# Patient Record
Sex: FEMALE | Race: WHITE | Employment: UNEMPLOYED | URBAN - METROPOLITAN AREA
[De-identification: names, ages, dates, MRNs, and addresses within clinical notes are randomized per-mention and may not be internally consistent; named-entity substitution may affect disease eponyms.]

---

## 2017-01-19 ENCOUNTER — OFFICE VISIT (OUTPATIENT)
Dept: ORTHOPEDIC SURGERY | Age: 82
End: 2017-01-19

## 2017-01-19 VITALS
TEMPERATURE: 98.2 F | DIASTOLIC BLOOD PRESSURE: 60 MMHG | HEIGHT: 60 IN | BODY MASS INDEX: 38.28 KG/M2 | HEART RATE: 72 BPM | RESPIRATION RATE: 16 BRPM | WEIGHT: 195 LBS | SYSTOLIC BLOOD PRESSURE: 118 MMHG

## 2017-01-19 DIAGNOSIS — M54.50 CHRONIC MIDLINE LOW BACK PAIN WITHOUT SCIATICA: ICD-10-CM

## 2017-01-19 DIAGNOSIS — M84.48XD SACRAL INSUFFICIENCY FRACTURE, WITH ROUTINE HEALING, SUBSEQUENT ENCOUNTER: Primary | ICD-10-CM

## 2017-01-19 DIAGNOSIS — M25.561 RIGHT KNEE PAIN, UNSPECIFIED CHRONICITY: ICD-10-CM

## 2017-01-19 DIAGNOSIS — G89.29 CHRONIC MIDLINE LOW BACK PAIN WITHOUT SCIATICA: ICD-10-CM

## 2017-01-19 DIAGNOSIS — M17.11 OSTEOARTHRITIS OF RIGHT KNEE, UNSPECIFIED OSTEOARTHRITIS TYPE: ICD-10-CM

## 2017-01-19 RX ORDER — DICLOFENAC SODIUM 10 MG/G
4 GEL TOPICAL 4 TIMES DAILY
Qty: 5 EACH | Refills: 3 | Status: SHIPPED | OUTPATIENT
Start: 2017-01-19 | End: 2017-06-22

## 2017-01-19 NOTE — PATIENT INSTRUCTIONS
Low Back Arthritis: Exercises  Your Care Instructions  Here are some examples of typical rehabilitation exercises for your condition. Start each exercise slowly. Ease off the exercise if you start to have pain. Your doctor or physical therapist will tell you when you can start these exercises and which ones will work best for you. When you are not being active, find a comfortable position for rest. Some people are comfortable on the floor or a medium-firm bed with a small pillow under their head and another under their knees. Some people prefer to lie on their side with a pillow between their knees. Don't stay in one position for too long. Take short walks (10 to 20 minutes) every 2 to 3 hours. Avoid slopes, hills, and stairs until you feel better. Walk only distances you can manage without pain, especially leg pain. How to do the exercises  Pelvic tilt    1. Lie on your back with your knees bent. 2. \"Brace\" your stomach--tighten your muscles by pulling in and imagining your belly button moving toward your spine. 3. Press your lower back into the floor. You should feel your hips and pelvis rock back. 4. Hold for 6 seconds while breathing smoothly. 5. Relax and allow your pelvis and hips to rock forward. 6. Repeat 8 to 12 times. Back stretches    1. Get down on your hands and knees on the floor. 2. Relax your head and allow it to droop. Round your back up toward the ceiling until you feel a nice stretch in your upper, middle, and lower back. Hold this stretch for as long as it feels comfortable, or about 15 to 30 seconds. 3. Return to the starting position with a flat back while you are on your hands and knees. 4. Let your back sway by pressing your stomach toward the floor. Lift your buttocks toward the ceiling. 5. Hold this position for 15 to 30 seconds. 6. Repeat 2 to 4 times. Follow-up care is a key part of your treatment and safety.  Be sure to make and go to all appointments, and call your doctor if you are having problems. It's also a good idea to know your test results and keep a list of the medicines you take. Where can you learn more? Go to http://margarita-eddie.info/. Enter J549 in the search box to learn more about \"Low Back Arthritis: Exercises. \"  Current as of: May 23, 2016  Content Version: 11.1  © 6168-7158 [employer]. Care instructions adapted under license by [employer] (which disclaims liability or warranty for this information). If you have questions about a medical condition or this instruction, always ask your healthcare professional. [place] [address] any warranty or liability for your use of this information. Knee Arthritis: Exercises  Your Care Instructions  Here are some examples of exercises for knee arthritis. Start each exercise slowly. Ease off the exercise if you start to have pain. Your doctor or physical therapist will tell you when you can start these exercises and which ones will work best for you. How to do the exercises  Knee flexion with heel slide    7. Lie on your back with your knees bent. 8. Slide your heel back by bending your affected knee as far as you can. Then hook your other foot around your ankle to help pull your heel even farther back. 9. Hold for about 6 seconds, then rest for up to 10 seconds. 10. Repeat 8 to 12 times. 11. Switch legs and repeat steps 1 through 4, even if only one knee is sore. Quad sets    7. Sit with your affected leg straight and supported on the floor or a firm bed. Place a small, rolled-up towel under your knee. Your other leg should be bent, with that foot flat on the floor. 8. Tighten the thigh muscles of your affected leg by pressing the back of your knee down into the towel. 9. Hold for about 6 seconds, then rest for up to 10 seconds. 10. Repeat 8 to 12 times. 11. Switch legs and repeat steps 1 through 4, even if only one knee is sore.   Straight-leg raises to the front    1. Lie on your back with your good knee bent so that your foot rests flat on the floor. Your affected leg should be straight. Make sure that your low back has a normal curve. You should be able to slip your hand in between the floor and the small of your back, with your palm touching the floor and your back touching the back of your hand. 2. Tighten the thigh muscles in your affected leg by pressing the back of your knee flat down to the floor. Hold your knee straight. 3. Keeping the thigh muscles tight and your leg straight, lift your affected leg up so that your heel is about 12 inches off the floor. Hold for about 6 seconds, then lower slowly. 4. Relax for up to 10 seconds between repetitions. 5. Repeat 8 to 12 times. 6. Switch legs and repeat steps 1 through 5, even if only one knee is sore. Active knee flexion    1. Lie on your stomach with your knees straight. If your kneecap is uncomfortable, roll up a washcloth and put it under your leg just above your kneecap. 2. Lift the foot of your affected leg by bending the knee so that you bring the foot up toward your buttock. If this motion hurts, try it without bending your knee quite as far. This may help you avoid any painful motion. 3. Slowly move your leg up and down. 4. Repeat 8 to 12 times. 5. Switch legs and repeat steps 1 through 4, even if only one knee is sore. Quadriceps stretch (facedown)    1. Lie flat on your stomach, and rest your face on the floor. 2. Wrap a towel or belt strap around the lower part of your affected leg. Then use the towel or belt strap to slowly pull your heel toward your buttock until you feel a stretch. 3. Hold for about 15 to 30 seconds, then relax your leg against the towel or belt strap. 4. Repeat 2 to 4 times. 5. Switch legs and repeat steps 1 through 4, even if only one knee is sore.   Stationary exercise bike    If you do not have a stationary exercise bike at home, you can find one to ride at your local health club or community center. 1. Adjust the height of the bike seat so that your knee is slightly bent when your leg is extended downward. If your knee hurts when the pedal reaches the top, you can raise the seat so that your knee does not bend as much. 2. Start slowly. At first, try to do 5 to 10 minutes of cycling with little to no resistance. Then increase your time and the resistance bit by bit until you can do 20 to 30 minutes without pain. 3. If you start to have pain, rest your knee until your pain gets back to the level that is normal for you. Or cycle for less time or with less effort. Follow-up care is a key part of your treatment and safety. Be sure to make and go to all appointments, and call your doctor if you are having problems. It's also a good idea to know your test results and keep a list of the medicines you take. Where can you learn more? Go to http://margarita-eddie.info/. Enter C159 in the search box to learn more about \"Knee Arthritis: Exercises. \"  Current as of: May 23, 2016  Content Version: 11.1  © 4531-1121 [employer]. Care instructions adapted under license by [employer] (which disclaims liability or warranty for this information). If you have questions about a medical condition or this instruction, always ask your healthcare professional. [address] any warranty or liability for your use of this information.

## 2017-01-19 NOTE — PROGRESS NOTES
[facility]  [address] [place], [address]  Phone: [phone]  Fax: [phone]          HISTORY OF PRESENT ILLNESS:  Ciarra Rodriguez is a [de-identified] y.o. female with history of lumbar pain. Pt had a sacroplasty with Dr. Bertha Genao since her last office visit and has experienced significant improvement in her pain. She c/o intermittent pain in the right knee since her sacroplasty. Pt states that she wakes up with pain in the right knee. She no longer takes Tylenol #3 since her pain has improved and wishes to continue with OTC Tylenol as needed. Pt has been prescribed Mobic 7.5 mg and takes it intermittently as needed. She has been using olive oil on her right knee with benefit. Pt at this time desires to proceed with medication evaluation. Pain Scale: 0 - No pain/10    PCP: Gely Little DO       Past Medical History   Diagnosis Date    Atrial fibrillation ([facility] [address]     Balance problem     Chest pain, unspecified      Resolved.  Chronic diastolic heart failure (HCC)      Stable symptoms.  Congestive heart failure, unspecified     Diarrhea 12/15/2015     f/u PCP     Essential hypertension     High blood pressure     Low back pain     Morbid obesity (HCC)     On amiodarone therapy 6/26/2015 12/14 SGOT45, SGPT41, TSH 2.7 3/10 84%DLCO     Other and unspecified hyperlipidemia     Pacemaker     Reflux     Trauma     Unspecified disorder of thyroid      Min hyperthyroidism. Social History     Social History    Marital status:      Spouse name: N/A    Number of children: N/A    Years of education: N/A     Occupational History    Not on file.      Social History Main Topics    Smoking status: Never Smoker    Smokeless tobacco: Never Used    Alcohol use No    Drug use: No    Sexual activity: Not on file     Other Topics Concern    Not on file     Social History Narrative       Current Outpatient Prescriptions Medication Sig Dispense Refill    diclofenac (VOLTAREN) 1 % gel Apply 4 g to affected area four (4) times daily. Apply to right knee 5 Each 3    amiodarone (CORDARONE) 200 mg tablet TAKE 1 TABLET BY MOUTH ONCE EVERY DAY 30 Tab 0    valsartan-hydrochlorothiazide (DIOVAN-HCT) 160-25 mg per tablet Take 1 Tab by mouth daily. 90 Tab 3    amLODIPine (NORVASC) 5 mg tablet Take 1 Tab by mouth daily. 90 Tab 3    pravastatin (PRAVACHOL) 40 mg tablet Take 1 Tab by mouth nightly. 90 Tab 3    ezetimibe (ZETIA) 10 mg tablet Take 1 Tab by mouth daily. 90 Tab 3    acetaminophen-codeine (TYLENOL #3) 300-30 mg per tablet 1 po bid prn severe pain  Indications: PAIN 60 Tab 0    ranitidine (ZANTAC) 150 mg tablet Take 150 mg by mouth two (2) times a day.  meloxicam (MOBIC) 7.5 mg tablet TAKE 1 TABLET BY MOUTH DAILY 90 Tab 0    miscellaneous medical supply (T.E.D. KNEE LENGTH-M-LONG) misc 2 Each by Does Not Apply route daily. 2 Each 0       Allergies   Allergen Reactions    Penicillins Unknown (comments)         REVIEW OF SYSTEMS    Constitutional: Negative for fever, chills, or weight change. Respiratory: Negative for cough or shortness of breath. Cardiovascular: Negative for chest pain or palpitations. Gastrointestinal: Negative for acid reflux, change in bowel habits, or constipation. Genitourinary: Negative for dysuria and flank pain. Musculoskeletal: Positive for lumbar pain. Skin: Negative for rash. Neurological: Negative for headaches, dizziness, or numbness. Endo/Heme/Allergies: Negative for increased bruising. Psychiatric/Behavioral: Negative for difficulty with sleep. PHYSICAL EXAMINATION  Visit Vitals    /60    Pulse 72    Temp 98.2 °F (36.8 °C) (Oral)    Resp 16    Ht 5' (1.524 m)    Wt 195 lb (88.5 kg)    BMI 38.08 kg/m2       Constitutional: Awake, alert, and in no acute distress  Neurological: 1+ symmetrical DTRs in the upper extremities.  1+ symmetrical DTRs in the lower extremities. Sensation to light touch is intact. Negative Shey's sign bilaterally. Skin: warm, dry, and intact. Musculoskeletal: Slight pronation on the right when walking. No tenderness to palpation in the lower lumbar region. No pain with extension, axial loading, or forward flexion. No pain with internal or external rotation of her hips. Negative straight leg raise bilaterally. Biceps  Triceps Deltoids Wrist Ext Wrist Flex Hand Intrin   Right +4/5 +4/5 +4/5 +4/5 +4/5 +4/5   Left +4/5 +4/5 +4/5 +4/5 +4/5 +4/5      Hip Flex  Quads Hamstrings Ankle DF EHL Ankle PF   Right +4/5 +4/5 +4/5 +4/5 +4/5 +4/5   Left +4/5 +4/5 +4/5 +4/5 +4/5 +4/5     IMAGING:    Lumbar CT from 08/02/2016 was personally reviewed with the Pt and demonstrated:  FINDINGS:     Acute or subacute fracture upper sacrum, predominantly through the left sacral  ala, horizontally across S2, with nondisplaced fracture line through S1. No  significant displacement. Associated mild sclerosis. No adjacent hematoma. The  sacroiliac joints are intact and stable, with mild degenerative change on the  right. The lumbar vertebra are intact.     Lumbar vertebral body heights are maintained. Grade I anterolisthesis of L4 on  L5 of 4 mm is not significantly changed. Disc space heights are maintained. Lower lumbar facet joint hypertrophy, severe at left L5-S1.     Diverticulosis. Left renal cyst.     T12-L1: Stable small broad-based disc protrusion with calcified posterior disc  annulus. No spinal stenosis or foraminal narrowing.     L1-L2: Unremarkable.     L2-L3: Stable mild bilateral facet hypertrophy. Minimal disc bulge without canal  or neural foraminal stenosis.     L3-L4: Mild to moderate bilateral facet joint hypertrophy, stable. Mild stable  disc bulge. No canal stenosis or foraminal narrowing.     L4-L5: Stable grade I anterolisthesis. No spinal stenosis or foraminal  narrowing.  Moderate bilateral facet joint hypertrophy, stable.     L5-S1: Minimal central disc protrusion. No canal stenosis or foraminal  narrowing. Mild right and severe left facet joint hypertrophy is similar to  prior.     IMPRESSION:  1. Acute/subacute left and central sacral fractures as above. 2. Grossly stable appearance of the lumbar spine with minimal degenerative disc  disease and stable grade I L4-L5 anterolisthesis. 3. Grossly stable lumbar facet joint hypertrophy, severe at left L5-S1.     Wet reading regarding fractures was provided to the referring physician via  the [employer] system. [address] was seen today for follow-up, back pain and knee pain. Diagnoses and all orders for this visit:    Sacral insufficiency fracture, with routine healing, subsequent encounter    Chronic midline low back pain without sciatica    Right knee pain, unspecified chronicity  -     diclofenac (VOLTAREN) 1 % gel; Apply 4 g to affected area four (4) times daily. Apply to right knee    Osteoarthritis of right knee, unspecified osteoarthritis type  -     diclofenac (VOLTAREN) 1 % gel; Apply 4 g to affected area four (4) times daily. Apply to right knee       IMPRESSION AND PLAN:  Naveed Nguyen is a [de-identified] y.o. female with history of lumbar pain. Pt had a sacroplasty with Dr. Satish Krishnan since her last office visit and has experienced significant improvement in her pain. She c/o intermittent pain in the right knee since her sacroplasty. Pt no longer takes Tylenol #3 since her pain has improved and wishes to continues with OTC Tylenol as needed. 1) Pt was given information on lumbar arthritis exercises. 2) I recommended the Pt try taking OTC Tylenol 500 mg 1-2 tabs daily. 3) She was prescribed Voltaren 1% gel. 4) Pt will continue taking Mobic 7.5 mg as needed and does not need a refill at this time. 5) Ms. Enrique Gutierres has a reminder for a \"due or due soon\" health maintenance.  I have asked that she contact her primary care provider, Cole Blackburn DO,  for follow-up on this health maintenance. 6)  reviewed. 7) Pt will follow-up as needed.       Written by Moreno Jensen, as dictated by Duc Evans MD.

## 2017-01-27 ENCOUNTER — OFFICE VISIT (OUTPATIENT)
Dept: CARDIOLOGY CLINIC | Age: 82
End: 2017-01-27

## 2017-01-27 VITALS
BODY MASS INDEX: 36.52 KG/M2 | HEART RATE: 77 BPM | DIASTOLIC BLOOD PRESSURE: 60 MMHG | WEIGHT: 186 LBS | HEIGHT: 60 IN | SYSTOLIC BLOOD PRESSURE: 117 MMHG

## 2017-01-27 DIAGNOSIS — Z95.0 CARDIAC PACEMAKER IN SITU: ICD-10-CM

## 2017-01-27 DIAGNOSIS — I48.0 PAROXYSMAL ATRIAL FIBRILLATION (HCC): ICD-10-CM

## 2017-01-27 DIAGNOSIS — Z79.899 ON AMIODARONE THERAPY: ICD-10-CM

## 2017-01-27 DIAGNOSIS — I50.32 CHRONIC DIASTOLIC HEART FAILURE (HCC): Primary | ICD-10-CM

## 2017-01-27 DIAGNOSIS — I10 ESSENTIAL HYPERTENSION, BENIGN: ICD-10-CM

## 2017-01-27 RX ORDER — AMLODIPINE BESYLATE 2.5 MG/1
2.5 TABLET ORAL DAILY
Qty: 90 TAB | Refills: 1 | Status: SHIPPED | OUTPATIENT
Start: 2017-01-27 | End: 2017-10-11 | Stop reason: SDUPTHER

## 2017-01-27 NOTE — PROGRESS NOTES
HISTORY OF PRESENT ILLNESS  Margarita Mendoza is a [de-identified] y.o. female. HPI Comments: 1/17 f/u of CHF, HTN, DDD, obesity    12/15 diarrhoea and diverticulitis for 1-2 wks; also happened in 11/15    CHF   The history is provided by the medical records. This is a chronic problem. The problem has not changed since onset. Associated symptoms include shortness of breath. Pertinent negatives include no chest pain and no headaches. Hypertension   The history is provided by the medical records. This is a chronic problem. Associated symptoms include shortness of breath. Pertinent negatives include no chest pain and no headaches. Cholesterol Problem   The history is provided by the medical records. This is a chronic problem. Associated symptoms include shortness of breath. Pertinent negatives include no chest pain and no headaches. Palpitations    The history is provided by the medical records (h/o AFib). This is a recurrent problem. The current episode started more than 1 week ago. The problem occurs daily. The problem is associated with exercise (walking). Associated symptoms include lower extremity edema, dizziness and shortness of breath. Pertinent negatives include no fever, no malaise/fatigue, no chest pain, no claudication, no orthopnea, no PND, no nausea, no vomiting, no headaches and no cough. Her past medical history is significant for hypertension. Leg Swelling   The history is provided by the patient. This is a chronic problem. The current episode started more than 1 week ago. The problem occurs every several days (minimal). Associated symptoms include shortness of breath. Pertinent negatives include no chest pain and no headaches. The symptoms are aggravated by standing. The symptoms are relieved by sleep. Shortness of Breath   The history is provided by the patient. This is a recurrent problem. The problem occurs frequently. The current episode started more than 1 week ago.  The problem has not changed since onset. Pertinent negatives include no fever, no headaches, no cough, no wheezing, no PND, no orthopnea, no chest pain, no vomiting, no rash, no leg swelling and no claudication. The problem's precipitants include exercise (walking 500-600 steps; 8/16 1000 steps;1/17 700 steps). Dizziness   The history is provided by the patient. This is a new problem. The current episode started more than 1 week ago (6/15). The problem occurs rarely. The problem has been rapidly improving. Associated symptoms include shortness of breath. Pertinent negatives include no chest pain and no headaches. Associated symptoms comments: No falls  . The symptoms are aggravated by standing. The symptoms are relieved by sleep. She has tried nothing for the symptoms. Review of Systems   Constitutional: Negative for chills, fever, malaise/fatigue and weight loss. HENT: Negative for nosebleeds. Eyes: Negative for discharge. Respiratory: Positive for shortness of breath. Negative for cough and wheezing. Cardiovascular: Positive for palpitations. Negative for chest pain, orthopnea, claudication, leg swelling and PND. Gastrointestinal: Negative for diarrhea, nausea and vomiting. Genitourinary: Negative for dysuria and hematuria. Musculoskeletal: Negative for joint pain. Skin: Negative for rash. Neurological: Positive for dizziness. Negative for seizures, loss of consciousness and headaches. Endo/Heme/Allergies: Negative for polydipsia. Does not bruise/bleed easily. Psychiatric/Behavioral: Negative for depression and substance abuse. The patient does not have insomnia. Allergies   Allergen Reactions    Penicillins Unknown (comments)     Pt states she's not allergic       Past Medical History   Diagnosis Date    Atrial fibrillation ([address]     Balance problem     Chest pain, unspecified      Resolved.  Chronic diastolic heart failure (HCC)      Stable symptoms.     Congestive heart failure, unspecified     Diarrhea 12/15/2015     f/u PCP     Essential hypertension     High blood pressure     Low back pain     Morbid obesity (HCC)     On amiodarone therapy 6/26/2015 12/14 SGOT45, SGPT41, TSH 2.7 3/10 84%DLCO     Other and unspecified hyperlipidemia     Pacemaker     Reflux     Trauma     Unspecified disorder of thyroid      Min hyperthyroidism. Family History   Problem Relation Age of Onset    Heart Attack Father 37    Sudden Death Father     Hypertension Mother     Heart Disease Neg Hx      No history of ischemic heart disease.  Stroke Neg Hx        Social History   Substance Use Topics    Smoking status: Never Smoker    Smokeless tobacco: Never Used    Alcohol use No        Current Outpatient Prescriptions   Medication Sig    diclofenac (VOLTAREN) 1 % gel Apply 4 g to affected area four (4) times daily. Apply to right knee    meloxicam (MOBIC) 7.5 mg tablet TAKE 1 TABLET BY MOUTH DAILY    amiodarone (CORDARONE) 200 mg tablet TAKE 1 TABLET BY MOUTH ONCE EVERY DAY    valsartan-hydrochlorothiazide (DIOVAN-HCT) 160-25 mg per tablet Take 1 Tab by mouth daily.  amLODIPine (NORVASC) 5 mg tablet Take 1 Tab by mouth daily.  pravastatin (PRAVACHOL) 40 mg tablet Take 1 Tab by mouth nightly.  ezetimibe (ZETIA) 10 mg tablet Take 1 Tab by mouth daily.  acetaminophen-codeine (TYLENOL #3) 300-30 mg per tablet 1 po bid prn severe pain  Indications: PAIN    ranitidine (ZANTAC) 150 mg tablet Take 150 mg by mouth two (2) times a day.  miscellaneous medical supply (T.E.D. KNEE LENGTH-M-LONG) misc 2 Each by Does Not Apply route daily. No current facility-administered medications for this visit. Past Surgical History   Procedure Laterality Date    Hx pacemaker  2002     Demand cardiac pacemaker-Had RV lead perforating and had sternot[facility].        Diagnostic Studies:  CARDIOLOGY STUDIES 3/10/2015 9/17/2013 6/1/2012 5/1/2012 3/1/2012 2/1/2012 6/1/2011   EKG Result - - - V paced, ?? underlying atrial rhythm. - - -   ICD / Pacemaker Check Result - - gen change office check [facility] function -   Myocardial Perfusion Scan Result - - - - - - -   Echocardiogram - Complete Result - - - - 65% EF, mild MR/AI, PAP 48. - -   PFT's with DLCO Result 84%DLCO 94%DLCO - - - - see report   24 hr Holter Monitor Result - - - - - - -       Visit Vitals    /60    Pulse 77    Ht 5' (1.524 m)    Wt 84.4 kg (186 lb)    BMI 36.33 kg/m2       Ms. Paco Dyson has a reminder for a \"due or due soon\" health maintenance. I have asked that she contact her primary care provider for follow-up on this health maintenance. Physical Exam   Constitutional: She is oriented to person, place, and time. She appears well-developed and well-nourished. No distress. HENT:   Head: Normocephalic and atraumatic. Mouth/Throat: Normal dentition. Eyes: Right eye exhibits no discharge. Left eye exhibits no discharge. No scleral icterus. Neck: Neck supple. No JVD present. Carotid bruit is not present. No thyromegaly present. Cardiovascular: Normal rate, regular rhythm, S1 normal, S2 normal, normal heart sounds and intact distal pulses. Exam reveals no gallop and no friction rub. No murmur heard. Pulmonary/Chest: Effort normal and breath sounds normal. She has no wheezes. She has no rales. Abdominal: Soft. She exhibits no mass. There is no tenderness. Musculoskeletal: She exhibits edema (trace). Lymphadenopathy:        Right cervical: No superficial cervical adenopathy present. Left cervical: No superficial cervical adenopathy present. Neurological: She is alert and oriented to person, place, and time. Skin: Skin is warm and dry. No rash noted. Psychiatric: She has a normal mood and affect. Her behavior is normal.       ASSESSMENT and [address] was seen today for chf, hypertension and cholesterol problem.     Diagnoses and all orders for this visit:    Chronic diastolic heart failure ([facility] [address]  Comments:  1/17 NYHA 2;     Essential hypertension, benign  Comments:  1/17 reduce norvasc to 2.5  8/16 reduce norvasc to 5  Orders:  -     amLODIPine (NORVASC) 2.5 mg tablet; Take 1 Tab by mouth daily. Paroxysmal atrial fibrillation (HCC)  Comments:  Staying SR on amio      Cardiac pacemaker in situ-DDD  Comments:   1/17 nl function, chr high V threshold    Orders:  -     PROGRAM EVAL (IN PERSON) IMPLANT DEVICE,PACEMAKER,MULT LEAD    On amiodarone therapy  Comments:  LFT 12/14 SGOT45, SGPT41, 10/15, 5/16(min incr SGOT 51); 12/16-stable SGOT  TFT nl 12/14, 6/15, 5/16  3/10; 3/15 84%DLCO  Orders:  -     PFT DLCO; Future  -     PULMONARY FUNCTION TEST; Future  -     T4, FREE (HDL ONLY); Future  -     TSH 3RD GENERATION; Future        Pertinent laboratory and test data reviewed and discussed with patient.   See patient instructions also for other medical advice given    Medications Discontinued During This Encounter   Medication Reason    amLODIPine (NORVASC) 5 mg tablet Reorder       Follow-up Disposition:  Return in about 6 months (around 7/27/2017), or if symptoms worsen or fail to improve, for post test.

## 2017-01-27 NOTE — LETTER
Oliva Hemphill 
1935 
 
1/27/2017 Dear Jemima Murry MD 
 
I had the pleasure of evaluating  Ms. Tim Marks in office today. Below are the relevant portions of my assessment and plan of care. ICD-10-CM ICD-9-CM 1. Chronic diastolic heart failure (HCC) I50.32 428.32   
 1/17 NYHA 2;   
2. Essential hypertension, benign I10 401.1 amLODIPine (NORVASC) 2.5 mg tablet 1/17 reduce norvasc to 2.5 
8/16 reduce norvasc to 5 3. Paroxysmal atrial fibrillation (HCC) I48.0 427.31 Staying SR on amio 4. Cardiac pacemaker in situ-DDD Z95.0 V45.01 PROGRAM EVAL (IN PERSON) IMPLANT DEVICE,PACEMAKER,MULT LEAD  
  1/17 nl function, chr high V threshold 5. On amiodarone therapy Z79.899 V58.69 PFT DLCO PULMONARY FUNCTION TEST T4, FREE (HDL ONLY) TSH 3RD GENERATION  
 LFT 12/14 SGOT45, SGPT41, 10/15, 5/16(min incr SGOT 51); 12/16-stable SGOT 
TFT nl 12/14, 6/15, 5/16 
3/10; 3/15 84%DLCO Current Outpatient Prescriptions Medication Sig Dispense Refill  amLODIPine (NORVASC) 2.5 mg tablet Take 1 Tab by mouth daily. 90 Tab 1  
 diclofenac (VOLTAREN) 1 % gel Apply 4 g to affected area four (4) times daily. Apply to right knee 5 Each 3  
 meloxicam (MOBIC) 7.5 mg tablet TAKE 1 TABLET BY MOUTH DAILY 90 Tab 0  
 amiodarone (CORDARONE) 200 mg tablet TAKE 1 TABLET BY MOUTH ONCE EVERY DAY 30 Tab 0  
 valsartan-hydrochlorothiazide (DIOVAN-HCT) 160-25 mg per tablet Take 1 Tab by mouth daily. 90 Tab 3  pravastatin (PRAVACHOL) 40 mg tablet Take 1 Tab by mouth nightly. 90 Tab 3  
 ezetimibe (ZETIA) 10 mg tablet Take 1 Tab by mouth daily. 90 Tab 3  
 acetaminophen-codeine (TYLENOL #3) 300-30 mg per tablet 1 po bid prn severe pain  Indications: PAIN 60 Tab 0  
 ranitidine (ZANTAC) 150 mg tablet Take 150 mg by mouth two (2) times a day.  miscellaneous medical supply (T.E.D. KNEE LENGTH-M-LONG) misc 2 Each by Does Not Apply route daily.  2 Each 0  
 
 
 Orders Placed This Encounter  T4, FREE (HDL ONLY) Standing Status:   Future Standing Expiration Date:   1/28/2018  TSH 3RD GENERATION Standing Status:   Future Standing Expiration Date:   4/29/2017  
 PROGRAM EVAL (IN PERSON) IMPLANT DEVICE,PACEMAKER,MULT LEAD Order Specific Question:   Reason for Exam: Answer:   f/u pacer  PFT DLCO Standing Status:   Future Standing Expiration Date:   4/29/2017  PULMONARY FUNCTION TEST Standing Status:   Future Standing Expiration Date:   4/29/2017  
 amLODIPine (NORVASC) 2.5 mg tablet Sig: Take 1 Tab by mouth daily. Dispense:  90 Tab Refill:  1 If you have questions, please do not hesitate to call me. I look forward to following Ms. Gomez along with you. Sincerely, Michael Negrete MD

## 2017-01-27 NOTE — PROGRESS NOTES
1. Have you been to the ER, urgent care clinic since your last visit? Hospitalized since your last visit? Yes Where: [facility]    2. Have you seen or consulted any other health care providers outside of the [address] [place] [address] since your last visit? Include any pap smears or colon screening. Yes Where: Dr Christy Diaz     3. Since your last visit, have you had any of the following symptoms? .           4. Have you had any blood work, X-rays or cardiac testing? Yes Where: [facility]         }    5.  Where do you normally have your labs drawn? [facility]    6. Do you need any refills today?    No

## 2017-01-27 NOTE — MR AVS SNAPSHOT
Visit Information Date & Time Provider Department Dept. Phone Encounter #  
 1/27/2017 10:00 AM Melissa Ocampo MD [facility] [id number] Follow-up Instructions Return in about 6 months (around 7/27/2017), or if symptoms worsen or fail to improve, for post test.  
  
Your Appointments 5/2/2017  8:00 AM  
[facility] [place] [facility] ([address] [place] [address] Road) Appt Note: [facility] Transmission/[address] [facility] [address]  
  
   
 [address] [address] [address] [address] 6/15/2017 10:15 AM  
ESTABLISHED PATIENT with Melissa Ocampo MD  
[facility] ([address] [place] [address]) Appt Note: 6 month follow up/PFT/HV/TSH/T4  
 [address] [facility]. [facility] [address]  
  
   
 [address] [facility] [address]  
  
    
 8/8/2017  8:00 AM  
[facility] [place] ([address] [place] [address] Road) Appt Note: [facility]/[address] [facility] [address]  
[phone]  
  
    
 11/14/2017  8:00 AM  
[facility] [place] ([address] [place] [address] Road) Appt Note: [facility]/[address] [facility] [address]  
[phone] Upcoming Health Maintenance Date Due DTaP/Tdap/Td series (1 - Tdap) 10/21/1956 ZOSTER VACCINE AGE 60> 10/21/1996 GLAUCOMA SCREENING Q2Y 10/21/2001 OSTEOPOROSIS SCREENING (DEXA) 10/21/2001 Pneumococcal 65+ Low/Medium Risk (1 of 2 - PCV13) 10/21/2001 MEDICARE YEARLY EXAM 10/21/2001 INFLUENZA AGE 9 TO ADULT 8/1/2016 Allergies as of 1/27/2017  Review Complete On: 1/27/2017 By: Melissa Ocampo MD  
  
 Severity Noted Reaction Type Reactions Penicillins  03/18/2013    Unknown (comments) Pt states she's not allergic Current Immunizations  Never Reviewed No immunizations on file. Not reviewed this visit You Were Diagnosed With   
  
 Codes Comments Chronic diastolic heart failure (HCC)    -  Primary ICD-10-CM: I50.32 
ICD-9-CM: 428.32 1/17 NYHA 2;   
 Essential hypertension, benign     ICD-10-CM: I10 
ICD-9-CM: 401.1 1/17 reduce norvasc to 2.5 
8/16 reduce norvasc to 5 Paroxysmal atrial fibrillation (HCC)     ICD-10-CM: I48.0 ICD-9-CM: 427.31 Staying SR on amio Cardiac pacemaker in situ     ICD-10-CM: Z95.0 ICD-9-CM: V45.01  1/17 nl function, chr high V threshold On amiodarone therapy     ICD-10-CM: Z79.899 ICD-9-CM: V58.69 LFT 12/14 SGOT45, SGPT41, 10/15, 5/16(min incr SGOT 51); 12/16-stable SGOT 
TFT nl 12/14, 6/15, 5/16 
3/10; 3/15 84%DLCO Vitals BP Pulse Height(growth percentile) Weight(growth percentile) BMI OB Status 117/60 77 5' (1.524 m) 186 lb (84.4 kg) 36.33 kg/m2 Postmenopausal  
 Smoking Status Never Smoker Vitals History BMI and BSA Data Body Mass Index Body Surface Area  
 36.33 kg/m 2 1.89 m 2 Preferred Pharmacy Pharmacy Name Phone [address] [address] [place] [address] [phone] [facility] [phone] Your Updated Medication List  
  
   
This list is accurate as of: 1/27/17 11:23 AM.  Always use your most recent med list.  
  
  
  
  
 acetaminophen-codeine 300-30 mg per tablet Commonly known as:  TYLENOL #3  
1 po bid prn severe pain  Indications: PAIN  
  
 amiodarone 200 mg tablet Commonly known as:  CORDARONE  
TAKE 1 TABLET BY MOUTH ONCE EVERY DAY  
  
 amLODIPine 2.5 mg tablet Commonly known as:  [place] Take 1 Tab by mouth daily. diclofenac 1 % Gel Commonly known as:  VOLTAREN Apply 4 g to affected area four (4) times daily. Apply to right knee  
  
 ezetimibe 10 mg tablet Commonly known as:  Dana Fajardo Take 1 Tab by mouth daily. meloxicam 7.5 mg tablet Commonly known as:  MOBIC  
 TAKE 1 TABLET BY MOUTH DAILY  
  
 miscellaneous medical supply Misc Commonly known as:  T.E.D. KNEE LENGTH-M-LONG  
2 Each by Does Not Apply route daily. pravastatin 40 mg tablet Commonly known as:  PRAVACHOL Take 1 Tab by mouth nightly. raNITIdine 150 mg tablet Commonly known as:  ZANTAC Take 150 mg by mouth two (2) times a day. valsartan-hydroCHLOROthiazide 160-25 mg per tablet Commonly known as:  DIOVAN-HCT Take 1 Tab by mouth daily. Prescriptions Sent to Pharmacy Refills  
 amLODIPine (NORVASC) 2.5 mg tablet 1 Sig: Take 1 Tab by mouth daily. Class: Normal  
 Pharmacy: [address] [place] [address] #: [phone] Route: Oral  
  
We Performed the Following PROGRAM EVAL (IN PERSON) IMPLANT DEVICE,PACEMAKER,MULT LEAD [id number] CPT(R)] Follow-up Instructions Return in about 6 months (around 7/27/2017), or if symptoms worsen or fail to improve, for post test.  
  
To-Do List   
 Around 01/28/2017 PFT:  PFT DLCO Around 01/28/2017 Lab:  T4, FREE (HDL ONLY) Around 01/28/2017 Lab:  TSH 3RD GENERATION   
  
 01/31/2017 11:00 AM  
  Appointment with [facility]ARE at [address] [place] [address] ([phone]) 1. Do NOT use any inhaled medications 24 hours prior to your breathing test.   2. Do NOT eat a heavy meal or smoke any tobacco products two hours prior to the appointment time. 3. Dress in loose, comfortable clothing. 4. Bring your photo ID, insurance cards and, if provided, the written physician order. 5. Report to the Patient Registration department on the first floor 15-20 minutes prior to your appointment time to check in.   6. If you have questions or need to reschedule, please call [phone]. Around 02/03/2017 PFT:  PULMONARY FUNCTION TEST Patient Instructions Medications Discontinued During This Encounter Medication Reason  amLODIPine (NORVASC) 5 mg tablet Reorder Orders Placed This Encounter  T4, FREE (HDL ONLY) Standing Status:   Future Standing Expiration Date:   1/28/2018  TSH 3RD GENERATION Standing Status:   Future Standing Expiration Date:   4/29/2017  
 PROGRAM EVAL (IN PERSON) IMPLANT DEVICE,PACEMAKER,MULT LEAD Order Specific Question:   Reason for Exam: Answer:   f/u pacer  PFT DLCO Standing Status:   Future Standing Expiration Date:   4/29/2017  PULMONARY FUNCTION TEST Standing Status:   Future Standing Expiration Date:   4/29/2017  
 amLODIPine (NORVASC) 2.5 mg tablet Sig: Take 1 Tab by mouth daily. Dispense:  90 Tab Refill:  1 Avoiding Triggers With Heart Failure: Care Instructions Your Care Instructions Triggers are anything that make your heart failure flare up. A flare-up is also called \"sudden heart failure\" or \"acute heart failure. \" When you have a flare-up, fluid builds up in your lungs, and you have problems breathing. You might need to go to the hospital. By watching for changes in your condition and avoiding triggers, you can prevent heart failure flare-ups. Follow-up care is a key part of your treatment and safety. Be sure to make and go to all appointments, and call your doctor if you are having problems. It's also a good idea to know your test results and keep a list of the medicines you take. How can you care for yourself at home? Watch for changes in your weight and condition · Weigh yourself without clothing at the same time each day. Record your weight. Call your doctor if you gain 3 pounds or more in 2 to 3 days. A sudden weight gain may mean that your heart failure is getting worse. · Keep a daily record of your symptoms. Write down any changes in how you feel, such as new shortness of breath, cough, or problems eating.  Also record if your ankles are more swollen than usual and if you have to urinate in the night more often. Note anything that you ate or did that could have triggered these changes. Limit sodium Sodium causes your body to hold on to water, making it harder for your heart to pump. People get most of their sodium from processed foods. Fast food and restaurant meals also tend to be very high in sodium. · Your doctor may suggest that you limit sodium to 2,000 milligrams (mg) a day or less. That is less than 1 teaspoon of salt a day, including all the salt you eat in cooking or in packaged foods. · Read food labels on cans and food packages. They tell you how much sodium you get in one serving. Check the serving size. If you eat more than one serving, you are getting more sodium. · Be aware that sodium can come in forms other than salt, including monosodium glutamate (MSG), sodium citrate, and sodium bicarbonate (baking soda). MSG is often added to Asian food. You can sometimes ask for food without MSG or salt. · Slowly reducing salt will help you adjust to the taste. Take the salt shaker off the table. · Flavor your food with garlic, lemon juice, onion, vinegar, herbs, and spices instead of salt. Do not use soy sauce, steak sauce, onion salt, garlic salt, mustard, or ketchup on your food, unless it is labeled \"low-sodium\" or \"low-salt. \" 
· Make your own salad dressings, sauces, and ketchup without adding salt. · Use fresh or frozen ingredients, instead of canned ones, whenever you can. Choose low-sodium canned goods. · Eat less processed food and food from restaurants, including fast food. Exercise as directed Moderate, regular exercise is very good for your heart. It improves your blood flow and helps control your weight. But too much exercise can stress your heart and cause a heart failure flare-up.  
· Check with your doctor before you start an exercise program. 
 · Walking is an easy way to get exercise. Start out slowly. Gradually increase the length and pace of your walk. Swimming, riding a bike, and using a treadmill are also good forms of exercise. · When you exercise, watch for signs that your heart is working too hard. You are pushing yourself too hard if you cannot talk while you are exercising. If you become short of breath or dizzy or have chest pain, stop, sit down, and rest. 
· Do not exercise when you do not feel well. Take medicines correctly · Take your medicines exactly as prescribed. Call your doctor if you think you are having a problem with your medicine. · Make a list of all the medicines you take. Include those prescribed to you by other doctors and any over-the-counter medicines, vitamins, or supplements you take. Take this list with you when you go to any doctor. · Take your medicines at the same time every day. It may help you to post a list of all the medicines you take every day and what time of day you take them. · Make taking your medicine as simple as you can. Plan times to take your medicines when you are doing other things, such as eating a meal or getting ready for bed. This will make it easier to remember to take your medicines. · Get organized. Use helpful tools, such as daily or weekly pill containers. When should you call for help? Call 911 if you have symptoms of sudden heart failure such as: 
· You have severe trouble breathing. · You cough up pink, foamy mucus. · You have a new irregular or rapid heartbeat. Call your doctor now or seek immediate medical care if: 
· You have new or increased shortness of breath. · You are dizzy or lightheaded, or you feel like you may faint. · You have sudden weight gain, such as 3 pounds or more in 2 to 3 days. · You have increased swelling in your legs, ankles, or feet. · You are suddenly so tired or weak that you cannot do your usual activities. Watch closely for changes in your health, and be sure to contact your doctor if you develop new symptoms. Where can you learn more? Go to http://margarita-eddie.info/. Enter P682 in the search box to learn more about \"Avoiding Triggers With Heart Failure: Care Instructions. \" Current as of: April 27, 2016 Content Version: 11.1 © 2326-0963 [employer]. Care instructions adapted under license by [employer] (which disclaims liability or warranty for this information). If you have questions about a medical condition or this instruction, always ask your healthcare professional. [address] any warranty or liability for your use of this information. Introducing [facility]! Catarino Bearden introduces Stir patient portal. Now you can access parts of your medical record, email your doctor's office, and request medication refills online. 1. In your internet browser, go to [email or web address] 2. Click on the First Time User? Click Here link in the Sign In box. You will see the New Member Sign Up page. 3. Enter your Stir Access Code exactly as it appears below. You will not need to use this code after youve completed the sign-up process. If you do not sign up before the expiration date, you must request a new code. · Stir Access Code: [email or web address] Expires: 4/19/2017  1:03 PM 
 
4. Enter the last four digits of your Social Security Number (xxxx) and Date of Birth (mm/dd/yyyy) as indicated and click Submit. You will be taken to the next sign-up page. 5. Create a datatrackert ID. This will be your Stir login ID and cannot be changed, so think of one that is secure and easy to remember. 6. Create a Stir password. You can change your password at any time. 7. Enter your Password Reset Question and Answer. This can be used at a later time if you forget your password. 8. Enter your e-mail address. You will receive e-mail notification when new information is available in [address] 9. Click Sign Up. You can now view and download portions of your medical record. 10. Click the Download Summary menu link to download a portable copy of your medical information. If you have questions, please visit the Frequently Asked Questions section of the [employer] website. Remember, [employer] is NOT to be used for urgent needs. For medical emergencies, dial 911. Now available from your iPhone and Android! Please provide this summary of care documentation to your next provider. Your primary care clinician is listed as [employer]. If you have any questions after today's visit, please call [phone].

## 2017-01-27 NOTE — PATIENT INSTRUCTIONS
Medications Discontinued During This Encounter   Medication Reason    amLODIPine (NORVASC) 5 mg tablet Reorder       Orders Placed This Encounter    T4, FREE (HDL ONLY)     Standing Status:   Future     Standing Expiration Date:   1/28/2018    TSH 3RD GENERATION     Standing Status:   Future     Standing Expiration Date:   4/29/2017    PROGRAM EVAL (IN PERSON) IMPLANT DEVICE,PACEMAKER,MULT LEAD     Order Specific Question:   Reason for Exam:     Answer:   f/u pacer    PFT DLCO     Standing Status:   Future     Standing Expiration Date:   4/29/2017    PULMONARY FUNCTION TEST     Standing Status:   Future     Standing Expiration Date:   4/29/2017    amLODIPine (NORVASC) 2.5 mg tablet     Sig: Take 1 Tab by mouth daily. Dispense:  90 Tab     Refill:  1          Avoiding Triggers With Heart Failure: Care Instructions  Your Care Instructions  Triggers are anything that make your heart failure flare up. A flare-up is also called \"sudden heart failure\" or \"acute heart failure. \" When you have a flare-up, fluid builds up in your lungs, and you have problems breathing. You might need to go to the hospital. By watching for changes in your condition and avoiding triggers, you can prevent heart failure flare-ups. Follow-up care is a key part of your treatment and safety. Be sure to make and go to all appointments, and call your doctor if you are having problems. It's also a good idea to know your test results and keep a list of the medicines you take. How can you care for yourself at home? Watch for changes in your weight and condition  · Weigh yourself without clothing at the same time each day. Record your weight. Call your doctor if you gain 3 pounds or more in 2 to 3 days. A sudden weight gain may mean that your heart failure is getting worse. · Keep a daily record of your symptoms. Write down any changes in how you feel, such as new shortness of breath, cough, or problems eating.  Also record if your ankles are more swollen than usual and if you have to urinate in the night more often. Note anything that you ate or did that could have triggered these changes. Limit sodium  Sodium causes your body to hold on to water, making it harder for your heart to pump. People get most of their sodium from processed foods. Fast food and restaurant meals also tend to be very high in sodium. · Your doctor may suggest that you limit sodium to 2,000 milligrams (mg) a day or less. That is less than 1 teaspoon of salt a day, including all the salt you eat in cooking or in packaged foods. · Read food labels on cans and food packages. They tell you how much sodium you get in one serving. Check the serving size. If you eat more than one serving, you are getting more sodium. · Be aware that sodium can come in forms other than salt, including monosodium glutamate (MSG), sodium citrate, and sodium bicarbonate (baking soda). MSG is often added to Asian food. You can sometimes ask for food without MSG or salt. · Slowly reducing salt will help you adjust to the taste. Take the salt shaker off the table. · Flavor your food with garlic, lemon juice, onion, vinegar, herbs, and spices instead of salt. Do not use soy sauce, steak sauce, onion salt, garlic salt, mustard, or ketchup on your food, unless it is labeled \"low-sodium\" or \"low-salt. \"  · Make your own salad dressings, sauces, and ketchup without adding salt. · Use fresh or frozen ingredients, instead of canned ones, whenever you can. Choose low-sodium canned goods. · Eat less processed food and food from restaurants, including fast food. Exercise as directed  Moderate, regular exercise is very good for your heart. It improves your blood flow and helps control your weight. But too much exercise can stress your heart and cause a heart failure flare-up. · Check with your doctor before you start an exercise program.  · Walking is an easy way to get exercise. Start out slowly.  Gradually increase the length and pace of your walk. Swimming, riding a bike, and using a treadmill are also good forms of exercise. · When you exercise, watch for signs that your heart is working too hard. You are pushing yourself too hard if you cannot talk while you are exercising. If you become short of breath or dizzy or have chest pain, stop, sit down, and rest.  · Do not exercise when you do not feel well. Take medicines correctly  · Take your medicines exactly as prescribed. Call your doctor if you think you are having a problem with your medicine. · Make a list of all the medicines you take. Include those prescribed to you by other doctors and any over-the-counter medicines, vitamins, or supplements you take. Take this list with you when you go to any doctor. · Take your medicines at the same time every day. It may help you to post a list of all the medicines you take every day and what time of day you take them. · Make taking your medicine as simple as you can. Plan times to take your medicines when you are doing other things, such as eating a meal or getting ready for bed. This will make it easier to remember to take your medicines. · Get organized. Use helpful tools, such as daily or weekly pill containers. When should you call for help? Call 911 if you have symptoms of sudden heart failure such as:  · You have severe trouble breathing. · You cough up pink, foamy mucus. · You have a new irregular or rapid heartbeat. Call your doctor now or seek immediate medical care if:  · You have new or increased shortness of breath. · You are dizzy or lightheaded, or you feel like you may faint. · You have sudden weight gain, such as 3 pounds or more in 2 to 3 days. · You have increased swelling in your legs, ankles, or feet. · You are suddenly so tired or weak that you cannot do your usual activities.   Watch closely for changes in your health, and be sure to contact your doctor if you develop new symptoms. Where can you learn more? Go to http://margarita-eddie.info/. Enter A613 in the search box to learn more about \"Avoiding Triggers With Heart Failure: Care Instructions. \"  Current as of: April 27, 2016  Content Version: 11.1  © 7357-2754 [employer]. Care instructions adapted under license by [employer] (which disclaims liability or warranty for this information). If you have questions about a medical condition or this instruction, always ask your healthcare professional. [address] any warranty or liability for your use of this information.

## 2017-01-28 DIAGNOSIS — Z79.899 ON AMIODARONE THERAPY: ICD-10-CM

## 2017-03-01 DIAGNOSIS — M51.36 DDD (DEGENERATIVE DISC DISEASE), LUMBAR: ICD-10-CM

## 2017-03-01 DIAGNOSIS — M47.816 LUMBAR FACET ARTHROPATHY: ICD-10-CM

## 2017-03-02 RX ORDER — MELOXICAM 7.5 MG/1
TABLET ORAL
Qty: 90 TAB | Refills: 0 | Status: SHIPPED | OUTPATIENT
Start: 2017-03-02 | End: 2017-06-05 | Stop reason: SDUPTHER

## 2017-03-08 NOTE — TELEPHONE ENCOUNTER
Called and informed patients son Shannan Gomez(HIPPA) that her prescription for Mobic/Meloxicam has been sent to her pharmacy. HE verbalized understanding.

## 2017-06-05 DIAGNOSIS — M47.816 LUMBAR FACET ARTHROPATHY: ICD-10-CM

## 2017-06-05 DIAGNOSIS — M51.36 DDD (DEGENERATIVE DISC DISEASE), LUMBAR: ICD-10-CM

## 2017-06-05 RX ORDER — MELOXICAM 7.5 MG/1
TABLET ORAL
Qty: 90 TAB | Refills: 0 | Status: SHIPPED | OUTPATIENT
Start: 2017-06-05 | End: 2017-06-13 | Stop reason: SDUPTHER

## 2017-06-05 NOTE — TELEPHONE ENCOUNTER
Patient can choose to get her Mobic from her PCP after this refill, otherwise she will need to schedule a follow-up before her next 3 month refill.

## 2017-06-13 RX ORDER — MELOXICAM 7.5 MG/1
TABLET ORAL
Qty: 90 TAB | Refills: 0 | Status: SHIPPED | OUTPATIENT
Start: 2017-06-13 | End: 2017-06-22

## 2017-06-13 NOTE — TELEPHONE ENCOUNTER
Repending the order as the previous prescription did not route to the patient's pharmacy. Patient will be called and advised of the below message.

## 2017-06-15 ENCOUNTER — OFFICE VISIT (OUTPATIENT)
Dept: CARDIOLOGY CLINIC | Age: 82
End: 2017-06-15

## 2017-06-15 VITALS
BODY MASS INDEX: 39.06 KG/M2 | HEART RATE: 65 BPM | DIASTOLIC BLOOD PRESSURE: 60 MMHG | WEIGHT: 200 LBS | SYSTOLIC BLOOD PRESSURE: 132 MMHG

## 2017-06-15 DIAGNOSIS — E78.00 PURE HYPERCHOLESTEROLEMIA: ICD-10-CM

## 2017-06-15 DIAGNOSIS — E66.01 MORBID OBESITY DUE TO EXCESS CALORIES (HCC): ICD-10-CM

## 2017-06-15 DIAGNOSIS — I48.0 PAROXYSMAL ATRIAL FIBRILLATION (HCC): Primary | ICD-10-CM

## 2017-06-15 DIAGNOSIS — Z79.899 ON AMIODARONE THERAPY: ICD-10-CM

## 2017-06-15 DIAGNOSIS — Z95.0 CARDIAC PACEMAKER IN SITU: ICD-10-CM

## 2017-06-15 DIAGNOSIS — I10 ESSENTIAL HYPERTENSION, BENIGN: ICD-10-CM

## 2017-06-15 DIAGNOSIS — I50.32 CHRONIC DIASTOLIC HEART FAILURE (HCC): ICD-10-CM

## 2017-06-15 RX ORDER — CYCLOBENZAPRINE HCL 5 MG
5 TABLET ORAL AS NEEDED
COMMUNITY

## 2017-06-15 RX ORDER — POLYETHYLENE GLYCOL 3350 17 G/17G
17 POWDER, FOR SOLUTION ORAL AS NEEDED
COMMUNITY

## 2017-06-15 RX ORDER — BENZONATATE 100 MG/1
100 CAPSULE ORAL
COMMUNITY

## 2017-06-15 RX ORDER — DOXYCYCLINE 100 MG/1
100 CAPSULE ORAL 2 TIMES DAILY
COMMUNITY
End: 2017-12-01 | Stop reason: ALTCHOICE

## 2017-06-15 NOTE — PROGRESS NOTES
HISTORY OF PRESENT ILLNESS  Margarita Olivares is a [de-identified] y.o. female. HPI Comments:  f/u of CHF, HTN, DDD, obesity  6/17 has cough x 3wks, improving slowly  12/15 diarrhoea and diverticulitis for 1-2 wks; also happened in 11/15    Cholesterol Problem   The history is provided by the medical records. This is a chronic problem. Associated symptoms include shortness of breath. Pertinent negatives include no chest pain and no headaches. CHF   The history is provided by the medical records. This is a chronic problem. The problem has not changed since onset. Associated symptoms include shortness of breath. Pertinent negatives include no chest pain and no headaches. Hypertension   The history is provided by the medical records. This is a chronic problem. Associated symptoms include shortness of breath. Pertinent negatives include no chest pain and no headaches. Palpitations    The history is provided by the medical records (h/o AFib). This is a recurrent problem. The current episode started more than 1 week ago. The problem occurs daily. The problem is associated with exercise (walking). Associated symptoms include lower extremity edema, dizziness and shortness of breath. Pertinent negatives include no fever, no malaise/fatigue, no chest pain, no claudication, no orthopnea, no PND, no nausea, no vomiting, no headaches and no cough. Her past medical history is significant for hypertension. Leg Swelling   The history is provided by the patient. This is a chronic problem. The current episode started more than 1 week ago. The problem occurs every several days (minimal). The problem has been rapidly improving. Associated symptoms include shortness of breath. Pertinent negatives include no chest pain and no headaches. The symptoms are aggravated by standing. The symptoms are relieved by sleep. Shortness of Breath   The history is provided by the patient. This is a recurrent problem.  The problem occurs frequently. The current episode started more than 1 week ago. The problem has not changed since onset. Pertinent negatives include no fever, no headaches, no cough, no wheezing, no PND, no orthopnea, no chest pain, no vomiting, no rash, no leg swelling and no claudication. The problem's precipitants include exercise (walking 500-600 steps; 8/16 1000 steps;1/17 700 steps). Dizziness   The history is provided by the patient. This is a new problem. The current episode started more than 1 week ago (6/15). The problem occurs rarely (<1/month). The problem has not changed since onset. Associated symptoms include shortness of breath. Pertinent negatives include no chest pain and no headaches. Associated symptoms comments: No falls  . The symptoms are aggravated by standing. The symptoms are relieved by sleep. She has tried nothing for the symptoms. Review of Systems   Constitutional: Negative for chills, fever, malaise/fatigue and weight loss. HENT: Negative for nosebleeds. Eyes: Negative for discharge. Respiratory: Positive for shortness of breath. Negative for cough and wheezing. Cardiovascular: Positive for palpitations. Negative for chest pain, orthopnea, claudication, leg swelling and PND. Gastrointestinal: Negative for diarrhea, nausea and vomiting. Genitourinary: Negative for dysuria and hematuria. Musculoskeletal: Negative for joint pain. Skin: Negative for rash. Neurological: Positive for dizziness. Negative for seizures, loss of consciousness and headaches. Endo/Heme/Allergies: Negative for polydipsia. Does not bruise/bleed easily. Psychiatric/Behavioral: Negative for depression and substance abuse. The patient does not have insomnia. Allergies   Allergen Reactions    Penicillins Unknown (comments)     Pt states she's not allergic       Past Medical History:   Diagnosis Date    Atrial fibrillation ([facility] [address]     Balance problem     Chest pain, unspecified     Resolved.     Chronic diastolic heart failure (HCC)     Stable symptoms.  Congestive heart failure, unspecified     Diarrhea 12/15/2015    f/u PCP     Essential hypertension     High blood pressure     Low back pain     Morbid obesity (HCC)     On amiodarone therapy 6/26/2015 12/14 SGOT45, SGPT41, TSH 2.7 3/10 84%DLCO     Other and unspecified hyperlipidemia     Pacemaker     Reflux     Trauma     Unspecified disorder of thyroid     Min hyperthyroidism. Family History   Problem Relation Age of Onset    Heart Attack Father 37    Sudden Death Father     Hypertension Mother     Heart Disease Neg Hx      No history of ischemic heart disease.  Stroke Neg Hx        Social History   Substance Use Topics    Smoking status: Never Smoker    Smokeless tobacco: Never Used    Alcohol use No        Current Outpatient Prescriptions   Medication Sig    meloxicam (MOBIC) 7.5 mg tablet TAKE 1 TABLET BY MOUTH DAILY    amLODIPine (NORVASC) 2.5 mg tablet Take 1 Tab by mouth daily.  diclofenac (VOLTAREN) 1 % gel Apply 4 g to affected area four (4) times daily. Apply to right knee    amiodarone (CORDARONE) 200 mg tablet TAKE 1 TABLET BY MOUTH ONCE EVERY DAY    valsartan-hydrochlorothiazide (DIOVAN-HCT) 160-25 mg per tablet Take 1 Tab by mouth daily.  pravastatin (PRAVACHOL) 40 mg tablet Take 1 Tab by mouth nightly.  ezetimibe (ZETIA) 10 mg tablet Take 1 Tab by mouth daily.  acetaminophen-codeine (TYLENOL #3) 300-30 mg per tablet 1 po bid prn severe pain  Indications: PAIN    miscellaneous medical supply (T.E.D. KNEE LENGTH-M-LONG) misc 2 Each by Does Not Apply route daily.  ranitidine (ZANTAC) 150 mg tablet Take 150 mg by mouth two (2) times a day. No current facility-administered medications for this visit. 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        Diagnostic Studies:  CARDIOLOGY STUDIES 3/10/2015 9/17/2013 6/1/2012 5/1/2012 3/1/2012 2/1/2012 6/1/2011   EKG Result - - - V paced, ?? underlying atrial rhythm. - - -   ICD / Pacemaker Check Result - - gen change office check NIKO - nl function -   Myocardial Perfusion Scan Result - - - - - - -   Echocardiogram - Complete Result - - - - 65% EF, mild MR/AI, PAP 48. - -   PFT's with DLCO Result 84%DLCO 94%DLCO - - - - see report   24 hr Holter Monitor Result - - - - - - -       Visit Vitals    /60    Pulse 65    Ht (P) 5' (1.524 m)    Wt 90.7 kg (200 lb)    BMI (P) 39.06 kg/m2       Ms. Ranjeet Darnell has a reminder for a \"due or due soon\" health maintenance. I have asked that she contact her primary care provider for follow-up on this health maintenance. Physical Exam   Constitutional: She is oriented to person, place, and time. She appears well-developed and well-nourished. No distress. HENT:   Head: Normocephalic and atraumatic. Mouth/Throat: Normal dentition. Eyes: Right eye exhibits no discharge. Left eye exhibits no discharge. No scleral icterus. Neck: Neck supple. No JVD present. Carotid bruit is not present. No thyromegaly present. Cardiovascular: Normal rate, regular rhythm, S1 normal, S2 normal, normal heart sounds and intact distal pulses. Exam reveals no gallop and no friction rub. No murmur heard. Pulmonary/Chest: Effort normal and breath sounds normal. She has no wheezes. She has no rales. Abdominal: Soft. She exhibits no mass. There is no tenderness. Musculoskeletal: She exhibits edema (trace). Lymphadenopathy:        Right cervical: No superficial cervical adenopathy present. Left cervical: No superficial cervical adenopathy present. Neurological: She is alert and oriented to person, place, and time. Skin: Skin is warm and dry. No rash noted. Psychiatric: She has a normal mood and affect.  Her behavior is normal.       JI and Eloy Cesar Cunningham was seen today for cholesterol problem, chf and hypertension. Diagnoses and all orders for this visit:    Paroxysmal atrial fibrillation [facility])  Comments:  1/17 occ rare AHR on pacer testing; Staying SR on amio; Orders:  -     PFT DLCO; Future  -     PULMONARY FUNCTION TEST; Future    Chronic diastolic heart failure ([facility] [address]  Comments:  6/17; 1/17 NYHA 2;   8/16 improving well; [id number]      Essential hypertension, benign  Comments:  6/17 controlled;     Pure hypercholesterolemia  Comments:  3/17 LDL75; 8/13 Low density lipoproteins (LDLs) are at goal, triglycerides are at goal, High density lipoproteins (HDLs) are at goal.    Morbid obesity due to excess calories ([facility] [address]  Comments:  Weight loss has been strongly encouraged by following dietary restrictions and an exercise routine. Cardiac pacemaker in situ-DDD  Comments:  needs carelink asap    On amiodarone therapy  Comments:  LFT 12/14 SGOT45, SGPT41, 10/15, 5/16(min incr SGOT 51); 12/16; 6/17-stable SGOT  TFT nl 12/14, 6/15, 5/16; 6/17  3/10; 3/15 84%DLCO  Orders:  -     PFT DLCO; Future  -     PULMONARY FUNCTION TEST; Future        Pertinent laboratory and test data reviewed and discussed with patient. See patient instructions also for other medical advice given    There are no discontinued medications.     Follow-up Disposition:  Return in about 6 months (around 12/15/2017), or if symptoms worsen or fail to improve, for post test.

## 2017-06-15 NOTE — LETTER
Paulo Mallika 
1935 
 
6/15/2017 Dear Jamee Boyle MD 
 
I had the pleasure of evaluating  Ms. Dmitry Ying in office today. Below are the relevant portions of my assessment and plan of care. ICD-10-CM ICD-9-CM 1. Paroxysmal atrial fibrillation (HCC) I48.0 427.31 PFT DLCO PULMONARY FUNCTION TEST  
 1/17 occ rare AHR on pacer testing; Staying SR on amio;   
2. Chronic diastolic heart failure ([address] I50.32 428.32   
 6/17; 1/17 NYHA 2;  
8/16 improving well; [id number] 3. Essential hypertension, benign I10 401.1 6/17 controlled; 4. Pure hypercholesterolemia E78.00 272.0   
 3/17 LDL75; 8/13 Low density lipoproteins (LDLs) are at goal, triglycerides are at goal, High density lipoproteins (HDLs) are at goal.  
5. Morbid obesity due to excess calories (HCC) E66.01 278.01 Weight loss has been strongly encouraged by following dietary restrictions and an exercise routine. 6. Cardiac pacemaker in situ-DDD Z95.0 V45.01   
 needs carelink asap 7. On amiodarone therapy Z79.899 V58.69 PFT DLCO PULMONARY FUNCTION TEST  
 LFT 12/14 SGOT45, SGPT41, 10/15, 5/16(min incr SGOT 51); 12/16; 6/17-stable SGOT 
TFT nl 12/14, 6/15, 5/16; 6/17 
3/10; 3/15 84%DLCO Current Outpatient Prescriptions Medication Sig Dispense Refill  benzonatate (TESSALON PERLES) 100 mg capsule Take 100 mg by mouth three (3) times daily as needed for Cough.  doxycycline (VIBRAMYCIN) 100 mg capsule Take 100 mg by mouth two (2) times a day.  cyclobenzaprine (FLEXERIL) 5 mg tablet Take 5 mg by mouth.  CALCIUM CARBONATE-VITAMIN D2 PO Take  by mouth.  polyethylene glycol (MIRALAX) 17 gram packet Take 17 g by mouth daily.  meloxicam (MOBIC) 7.5 mg tablet TAKE 1 TABLET BY MOUTH DAILY (Patient taking differently: 15 mg. TAKE 1 TABLET BY MOUTH DAILY) 90 Tab 0  
 amLODIPine (NORVASC) 2.5 mg tablet Take 1 Tab by mouth daily.  (Patient taking differently: Take 10 mg by mouth daily.) 90 Tab 1  
 diclofenac (VOLTAREN) 1 % gel Apply 4 g to affected area four (4) times daily. Apply to right knee 5 Each 3  
 amiodarone (CORDARONE) 200 mg tablet TAKE 1 TABLET BY MOUTH ONCE EVERY DAY 30 Tab 0  
 valsartan-hydrochlorothiazide (DIOVAN-HCT) 160-25 mg per tablet Take 1 Tab by mouth daily. 90 Tab 3  pravastatin (PRAVACHOL) 40 mg tablet Take 1 Tab by mouth nightly. 90 Tab 3  
 ezetimibe (ZETIA) 10 mg tablet Take 1 Tab by mouth daily. 90 Tab 3  
 miscellaneous medical supply (T.E.D. KNEE LENGTH-M-LONG) misc 2 Each by Does Not Apply route daily. 2 Each 0  
 ranitidine (ZANTAC) 150 mg tablet Take 150 mg by mouth two (2) times a day.  acetaminophen-codeine (TYLENOL #3) 300-30 mg per tablet 1 po bid prn severe pain  Indications: PAIN 60 Tab 0 Orders Placed This Encounter  PFT DLCO Standing Status:   Future Standing Expiration Date:   9/15/2017  PULMONARY FUNCTION TEST Standing Status:   Future Standing Expiration Date:   9/15/2017  benzonatate (TESSALON PERLES) 100 mg capsule Sig: Take 100 mg by mouth three (3) times daily as needed for Cough.  doxycycline (VIBRAMYCIN) 100 mg capsule Sig: Take 100 mg by mouth two (2) times a day.  cyclobenzaprine (FLEXERIL) 5 mg tablet Sig: Take 5 mg by mouth.  CALCIUM CARBONATE-VITAMIN D2 PO Sig: Take  by mouth.  polyethylene glycol (MIRALAX) 17 gram packet Sig: Take 17 g by mouth daily. If you have questions, please do not hesitate to call me. I look forward to following Ms. Gomez along with you. Sincerely, Leonardo Castillo MD

## 2017-06-15 NOTE — PROGRESS NOTES
1. Have you been to the ER, urgent care clinic since your last visit? Hospitalized since your last visit? Yes Where: [facility]    2. Have you seen or consulted any other health care providers outside of the [facility] since your last visit? Include any pap smears or colon screening. Yes Where: pcp     3. Since your last visit, have you had any of the following symptoms? .           4. Have you had any blood work, X-rays or cardiac testing? Yes Where: pcp            5.  Where do you normally have your labs drawn?   pcp offfice    6. Do you need any refills today?    No

## 2017-06-15 NOTE — MR AVS SNAPSHOT
Visit Information Date & Time Provider Department Dept. Phone Encounter #  
 6/15/2017 10:15 AM George Borrego MD [facility] [phone] [id number] Follow-up Instructions Return in about 6 months (around 12/15/2017), or if symptoms worsen or fail to improve, for post test.  
  
Your Appointments 8/8/2017  8:00 AM  
[facility] ([address]) Appt Note: [facility] Transmission/[address] [facility] [address]  
  
   
 [address] [facility] [address]  
  
    
 11/14/2017  8:00 AM  
[facility] ([address] [facility]) Appt Note: [facility] Transmission/[address] [facility] [address]  
[phone] Upcoming Health Maintenance Date Due DTaP/Tdap/Td series (1 - Tdap) 10/21/1956 ZOSTER VACCINE AGE 60> 10/21/1996 GLAUCOMA SCREENING Q2Y 10/21/2001 OSTEOPOROSIS SCREENING (DEXA) 10/21/2001 Pneumococcal 65+ Low/Medium Risk (1 of 2 - PCV13) 10/21/2001 MEDICARE YEARLY EXAM 10/21/2001 INFLUENZA AGE 9 TO ADULT 8/1/2017 Allergies as of 6/15/2017  Review Complete On: 6/15/2017 By: George Borrego MD  
  
 Severity Noted Reaction Type Reactions Penicillins  03/18/2013    Unknown (comments) Pt states she's not allergic Current Immunizations  Never Reviewed No immunizations on file. Not reviewed this visit You Were Diagnosed With   
  
 Codes Comments Paroxysmal atrial fibrillation (HCC)    -  Primary ICD-10-CM: I48.0 ICD-9-CM: 427.31 1/17 occ rare AHR on pacer testing; Staying SR on amio; Chronic diastolic heart failure ([facility] [address]     ICD-10-CM: I50.32 
ICD-9-CM: 428.32 6/17; 1/17 NYHA 2;  
8/16 improving well; GVDN0  Essential hypertension, benign     ICD-10-CM: I10 
ICD-9-CM: 401.1 6/17 controlled;   
 Pure hypercholesterolemia     ICD-10-CM: E78.00 ICD-9-CM: 272.0 3/17 LDL75; 8/13 Low density lipoproteins (LDLs) are at goal, triglycerides are at goal, High density lipoproteins (HDLs) are at goal.  
 Morbid obesity due to excess calories (HCC)     ICD-10-CM: E66.01 
ICD-9-CM: 278.01 Weight loss has been strongly encouraged by following dietary restrictions and an exercise routine. Cardiac pacemaker in situ     ICD-10-CM: Z95.0 ICD-9-CM: V45.01 needs carelink asap On amiodarone therapy     ICD-10-CM: Z79.899 ICD-9-CM: V58.69 LFT 12/14 SGOT45, SGPT41, 10/15, 5/16(min incr SGOT 51); 12/16; 6/17-stable SGOT 
TFT nl 12/14, 6/15, 5/16; 6/17 
3/10; 3/15 84%DLCO Vitals BP Pulse Height(growth percentile) Weight(growth percentile) BMI OB Status 132/60 65 (P) 5' (1.524 m) 200 lb (90.7 kg) (P) 39.06 kg/m2 Postmenopausal  
 Smoking Status Never Smoker Vitals History BMI and BSA Data Body Mass Index Body Surface Area (P) 39.06 kg/m 2 (P) 1.96 m 2 Preferred Pharmacy Pharmacy Name Phone [address], [address] [place] [address] [phone] [facility] [phone] Your Updated Medication List  
  
   
This list is accurate as of: 6/15/17 11:48 AM.  Always use your most recent med list.  
  
  
  
  
 acetaminophen-codeine 300-30 mg per tablet Commonly known as:  TYLENOL #3  
1 po bid prn severe pain  Indications: PAIN  
  
 amiodarone 200 mg tablet Commonly known as:  CORDARONE  
TAKE 1 TABLET BY MOUTH ONCE EVERY DAY  
  
 amLODIPine 2.5 mg tablet Commonly known as:  Corinne Evans Take 1 Tab by mouth daily. CALCIUM CARBONATE-VITAMIN D2 PO Take  by mouth. cyclobenzaprine 5 mg tablet Commonly known as:  FLEXERIL Take 5 mg by mouth. diclofenac 1 % Gel Commonly known as:  VOLTAREN Apply 4 g to affected area four (4) times daily. Apply to right knee  
  
 ezetimibe 10 mg tablet Commonly known as:  Dalila Morale Take 1 Tab by mouth daily. meloxicam 7.5 mg tablet Commonly known as:  MOBIC  
TAKE 1 TABLET BY MOUTH DAILY MIRALAX 17 gram packet Generic drug:  polyethylene glycol Take 17 g by mouth daily. miscellaneous medical supply Misc Commonly known as:  T.E.D. KNEE LENGTH-M-LONG  
2 Each by Does Not Apply route daily. pravastatin 40 mg tablet Commonly known as:  PRAVACHOL Take 1 Tab by mouth nightly. raNITIdine 150 mg tablet Commonly known as:  ZANTAC Take 150 mg by mouth two (2) times a day. TESSALON PERLES 100 mg capsule Generic drug:  benzonatate Take 100 mg by mouth three (3) times daily as needed for Cough. valsartan-hydroCHLOROthiazide 160-25 mg per tablet Commonly known as:  DIOVAN-HCT Take 1 Tab by mouth daily. VIBRAMYCIN 100 mg capsule Generic drug:  doxycycline Take 100 mg by mouth two (2) times a day. Follow-up Instructions Return in about 6 months (around 12/15/2017), or if symptoms worsen or fail to improve, for post test.  
  
To-Do List   
 07/05/2017 11:00 AM  
  Appointment with [address] [place] [address] at [address] [place] [address] ([phone]) 1. Do NOT use any inhaled medications 24 hours prior to your breathing test.   2. Do NOT eat a heavy meal or smoke any tobacco products two hours prior to the appointment time. 3. Dress in loose, comfortable clothing. 4. Bring your photo ID, insurance cards and, if provided, the written physician order. 5. Report to the Patient Registration department on the first floor 15-20 minutes prior to your appointment time to check in.   6. If you have questions or need to reschedule, please call [phone]. Around 07/15/2017 PFT:  PFT DLCO Around 07/15/2017 PFT:  PULMONARY FUNCTION TEST Patient Instructions   
please get remote checks for pacer or ICD every 3 months and Office check in 1 yr Please call our office after every transmission to confirm success of transmission There are no discontinued medications. Orders Placed This Encounter  PFT DLCO Standing Status:   Future Standing Expiration Date:   9/15/2017  PULMONARY FUNCTION TEST Standing Status:   Future Standing Expiration Date:   9/15/2017 Atrial Fibrillation: Care Instructions Your Care Instructions Atrial fibrillation is an irregular and often fast heartbeat. Treating this condition is important for several reasons. It can cause blood clots, which can travel from your heart to your brain and cause a stroke. If you have a fast heartbeat, you may feel lightheaded, dizzy, and weak. An irregular heartbeat can also increase your risk for heart failure. Atrial fibrillation is often the result of another heart condition, such as high blood pressure or coronary artery disease. Making changes to improve your heart condition will help you stay healthy and active. Follow-up care is a key part of your treatment and safety. Be sure to make and go to all appointments, and call your doctor if you are having problems. It's also a good idea to know your test results and keep a list of the medicines you take. How can you care for yourself at home? Medicines · Take your medicines exactly as prescribed. Call your doctor if you think you are having a problem with your medicine. You will get more details on the specific medicines your doctor prescribes. · If your doctor has given you a blood thinner to prevent a stroke, be sure you get instructions about how to take your medicine safely. Blood thinners can cause serious bleeding problems. · Do not take any vitamins, over-the-counter drugs, or herbal products without talking to your doctor first. 
Lifestyle changes · Do not smoke. Smoking can increase your chance of a stroke and heart attack.  If you need help quitting, talk to your doctor about stop-smoking programs and medicines. These can increase your chances of quitting for good. · Eat a heart-healthy diet. · Stay at a healthy weight. Lose weight if you need to. · Limit alcohol to 2 drinks a day for men and 1 drink a day for women. Too much alcohol can cause health problems. · Avoid colds and flu. Get a pneumococcal vaccine shot. If you have had one before, ask your doctor whether you need another dose. Get a flu shot every year. If you must be around people with colds or flu, wash your hands often. Activity · If your doctor recommends it, get more exercise. Walking is a good choice. Bit by bit, increase the amount you walk every day. Try for at least 30 minutes on most days of the week. You also may want to swim, bike, or do other activities. Your doctor may suggest that you join a cardiac rehabilitation program so that you can have help increasing your physical activity safely. · Start light exercise if your doctor says it is okay. Even a small amount will help you get stronger, have more energy, and manage stress. Walking is an easy way to get exercise. Start out by walking a little more than you did in the hospital. Gradually increase the amount you walk. · When you exercise, watch for signs that your heart is working too hard. You are pushing too hard if you cannot talk while you are exercising. If you become short of breath or dizzy or have chest pain, sit down and rest immediately. · Check your pulse regularly. Place two fingers on the artery at the palm side of your wrist, in line with your thumb. If your heartbeat seems uneven or fast, talk to your doctor. When should you call for help? Call 911 anytime you think you may need emergency care. For example, call if: 
· You have symptoms of a heart attack. These may include: ¨ Chest pain or pressure, or a strange feeling in the chest. 
¨ Sweating. ¨ Shortness of breath. ¨ Nausea or vomiting. ¨ Pain, pressure, or a strange feeling in the back, neck, jaw, or upper belly or in one or both shoulders or arms. ¨ Lightheadedness or sudden weakness. ¨ A fast or irregular heartbeat. After you call 911, the  may tell you to chew 1 adult-strength or 2 to 4 low-dose aspirin. Wait for an ambulance. Do not try to drive yourself. · You have symptoms of a stroke. These may include: 
¨ Sudden numbness, tingling, weakness, or loss of movement in your face, arm, or leg, especially on only one side of your body. ¨ Sudden vision changes. ¨ Sudden trouble speaking. ¨ Sudden confusion or trouble understanding simple statements. ¨ Sudden problems with walking or balance. ¨ A sudden, severe headache that is different from past headaches. · You passed out (lost consciousness). Call your doctor now or seek immediate medical care if: 
· You have new or increased shortness of breath. · You feel dizzy or lightheaded, or you feel like you may faint. · Your heart rate becomes irregular. · You can feel your heart flutter in your chest or skip heartbeats. Tell your doctor if these symptoms are new or worse. Watch closely for changes in your health, and be sure to contact your doctor if you have any problems. Where can you learn more? Go to http://margarita-eddie.info/. Enter U020 in the search box to learn more about \"Atrial Fibrillation: Care Instructions. \" Current as of: January 27, 2016 Content Version: 11.2 © 7309-7916 [employer]. Care instructions adapted under license by [employer] (which disclaims liability or warranty for this information). If you have questions about a medical condition or this instruction, always ask your healthcare professional. [place] [address] any warranty or liability for your use of this information. Introducing [facility]!    
 Camilla Johnson introduces [employer] patient portal. Now you can access parts of your medical record, email your doctor's office, and request medication refills online. 1. In your internet browser, go to [email or web address] 2. Click on the First Time User? Click Here link in the Sign In box. You will see the New Member Sign Up page. 3. Enter your [email or web address] Access Code exactly as it appears below. You will not need to use this code after youve completed the sign-up process. If you do not sign up before the expiration date, you must request a new code. · [email or web address] Access Code: [email or web address] Expires: 9/13/2017 10:44 AM 
 
4. Enter the last four digits of your Social Security Number (xxxx) and Date of Birth (mm/dd/yyyy) as indicated and click Submit. You will be taken to the next sign-up page. 5. Create a [email or web address] ID. This will be your [email or web address] login ID and cannot be changed, so think of one that is secure and easy to remember. 6. Create a [email or web address] password. You can change your password at any time. 7. Enter your Password Reset Question and Answer. This can be used at a later time if you forget your password. 8. Enter your e-mail address. You will receive e-mail notification when new information is available in [address] 9. Click Sign Up. You can now view and download portions of your medical record. 10. Click the Download Summary menu link to download a portable copy of your medical information. If you have questions, please visit the Frequently Asked Questions section of the [email or web address] website. Remember, [email or web address] is NOT to be used for urgent needs. For medical emergencies, dial 911. Now available from your iPhone and Android! Please provide this summary of care documentation to your next provider. Your primary care clinician is listed as [employer]. If you have any questions after today's visit, please call [phone].

## 2017-06-22 ENCOUNTER — OFFICE VISIT (OUTPATIENT)
Dept: CARDIOLOGY CLINIC | Age: 82
End: 2017-06-22

## 2017-06-22 VITALS
SYSTOLIC BLOOD PRESSURE: 127 MMHG | HEIGHT: 60 IN | HEART RATE: 75 BPM | WEIGHT: 198 LBS | DIASTOLIC BLOOD PRESSURE: 57 MMHG | BODY MASS INDEX: 38.87 KG/M2

## 2017-06-22 DIAGNOSIS — Z79.899 ON AMIODARONE THERAPY: ICD-10-CM

## 2017-06-22 DIAGNOSIS — E78.00 PURE HYPERCHOLESTEROLEMIA: ICD-10-CM

## 2017-06-22 DIAGNOSIS — I10 ESSENTIAL HYPERTENSION, BENIGN: ICD-10-CM

## 2017-06-22 DIAGNOSIS — I50.32 CHRONIC DIASTOLIC HEART FAILURE (HCC): ICD-10-CM

## 2017-06-22 DIAGNOSIS — I48.0 PAROXYSMAL ATRIAL FIBRILLATION (HCC): Primary | ICD-10-CM

## 2017-06-22 RX ORDER — MOMETASONE FUROATE 50 UG/1
2 SPRAY, METERED NASAL DAILY
COMMUNITY

## 2017-06-22 NOTE — PROGRESS NOTES
Patient brought medications list    1. Have you been to the ER, urgent care clinic since your last visit? Hospitalized since your last visit? NO    2. Have you seen or consulted any other health care providers outside of the [address] [place] [address] since your last visit? Include any pap smears or colon screening. No     3. Since your last visit, have you had any of the following symptoms? shortness of breath. 4.  Have you had any blood work, X-rays or cardiac testing? No     5. Where do you normally have your labs drawn? [facility]    6. Do you need any refills today?    No

## 2017-06-22 NOTE — LETTER
Ino Cristian 
1935 
 
6/22/2017 Dear Kenyetta Sánchez DO I had the pleasure of evaluating  Ms. Marcio Alexander in office today. Below are the relevant portions of my assessment and plan of care. ICD-10-CM ICD-9-CM 1. Paroxysmal atrial fibrillation (HCC) I48.0 427.31 apixaban (ELIQUIS) 5 mg tablet CBC W/O DIFF  
   METABOLIC PANEL, BASIC  
 2/72 PAF(158 episodes in 6 months, longest for 10.5 mts), start eliquis; 1/17 occ rare AHR on pacer testing; Staying SR on amio; 2. Essential hypertension, benign I10 401.1 CBC W/O DIFF  
   METABOLIC PANEL, BASIC  
 0/39 controlled;  
3. Chronic diastolic heart failure ([address] I50.32 428.32   
 6/17; 1/17 NYHA 2;   
4. Pure hypercholesterolemia E78.00 272.0   
 3/17 LDL75;  
5. On amiodarone therapy Z79.899 V58.69   
 LFT 12/14 SGOT45, SGPT41, 10/15, 5/16(min incr SGOT 51); 12/16; 6/17-stable SGOT 
TFT nl 12/14, 6/15, 5/16; 6/17 
3/10; 3/15 84%DLCO Current Outpatient Prescriptions Medication Sig Dispense Refill  mometasone (NASONEX) 50 mcg/actuation nasal spray 2 Sprays daily.  apixaban (ELIQUIS) 5 mg tablet Take 1 Tab by mouth two (2) times a day. 60 Tab 6  
 benzonatate (TESSALON PERLES) 100 mg capsule Take 100 mg by mouth three (3) times daily as needed for Cough.  doxycycline (VIBRAMYCIN) 100 mg capsule Take 100 mg by mouth two (2) times a day.  cyclobenzaprine (FLEXERIL) 5 mg tablet Take 5 mg by mouth.  CALCIUM CARBONATE-VITAMIN D2 PO Take  by mouth.  polyethylene glycol (MIRALAX) 17 gram packet Take 17 g by mouth daily.  amLODIPine (NORVASC) 2.5 mg tablet Take 1 Tab by mouth daily. (Patient taking differently: Take 10 mg by mouth daily.) 90 Tab 1  
 amiodarone (CORDARONE) 200 mg tablet TAKE 1 TABLET BY MOUTH ONCE EVERY DAY 30 Tab 0  
 valsartan-hydrochlorothiazide (DIOVAN-HCT) 160-25 mg per tablet Take 1 Tab by mouth daily.  90 Tab 3  
  pravastatin (PRAVACHOL) 40 mg tablet Take 1 Tab by mouth nightly. 90 Tab 3  
 ezetimibe (ZETIA) 10 mg tablet Take 1 Tab by mouth daily. 90 Tab 3  
 acetaminophen-codeine (TYLENOL #3) 300-30 mg per tablet 1 po bid prn severe pain  Indications: PAIN 60 Tab 0  
 ranitidine (ZANTAC) 150 mg tablet Take 150 mg by mouth two (2) times a day. Orders Placed This Encounter  CBC W/O DIFF Standing Status:   Future Standing Expiration Date:   2017  METABOLIC PANEL, BASIC Standing Status:   Future Standing Expiration Date:   2017  mometasone (NASONEX) 50 mcg/actuation nasal spray Si Sprays daily.  apixaban (ELIQUIS) 5 mg tablet Sig: Take 1 Tab by mouth two (2) times a day. Dispense:  60 Tab Refill:  6 If you have questions, please do not hesitate to call me. I look forward to following Ms. Gomez along with you. Sincerely, Carley Onofre MD

## 2017-06-22 NOTE — PROGRESS NOTES
HISTORY OF PRESENT ILLNESS  Margarita Sparks is a [de-identified] y.o. female. HPI Comments:  f/u of CHF, HTN, DDD, obesity  6/17 has cough x 3wks, improving slowly  12/15 diarrhoea and diverticulitis for 1-2 wks; also happened in 11/15    Palpitations    The history is provided by the medical records (h/o Parox AFib). This is a recurrent problem. The current episode started more than 1 week ago. The problem occurs daily. The problem is associated with exercise (walking). Associated symptoms include lower extremity edema, dizziness and shortness of breath. Pertinent negatives include no fever, no malaise/fatigue, no chest pain, no claudication, no orthopnea, no PND, no nausea, no vomiting, no headaches and no cough. Her past medical history is significant for hypertension. CHF   The history is provided by the medical records. This is a chronic problem. The problem has not changed since onset. Associated symptoms include shortness of breath. Pertinent negatives include no chest pain and no headaches. Hypertension   The history is provided by the medical records. This is a chronic problem. Associated symptoms include shortness of breath. Pertinent negatives include no chest pain and no headaches. Cholesterol Problem   The history is provided by the medical records. This is a chronic problem. Associated symptoms include shortness of breath. Pertinent negatives include no chest pain and no headaches. Leg Swelling   The history is provided by the patient. This is a chronic problem. The current episode started more than 1 week ago. The problem occurs every several days (minimal). The problem has been rapidly improving. Associated symptoms include shortness of breath. Pertinent negatives include no chest pain and no headaches. The symptoms are aggravated by standing. The symptoms are relieved by sleep. Shortness of Breath   The history is provided by the patient. This is a recurrent problem.  The problem occurs frequently. The current episode started more than 1 week ago. The problem has not changed since onset. Pertinent negatives include no fever, no headaches, no cough, no wheezing, no PND, no orthopnea, no chest pain, no vomiting, no rash, no leg swelling and no claudication. The problem's precipitants include exercise (walking 500-600 steps; 8/16 1000 steps;1/17 700 steps). Dizziness   The history is provided by the patient. This is a new problem. The current episode started more than 1 week ago (6/15). The problem occurs rarely (<1/month). The problem has not changed since onset. Associated symptoms include shortness of breath. Pertinent negatives include no chest pain and no headaches. Associated symptoms comments: No falls  . The symptoms are aggravated by standing. The symptoms are relieved by sleep. She has tried nothing for the symptoms. Review of Systems   Constitutional: Negative for chills, fever, malaise/fatigue and weight loss. HENT: Negative for nosebleeds. Eyes: Negative for discharge. Respiratory: Positive for shortness of breath. Negative for cough and wheezing. Cardiovascular: Positive for palpitations. Negative for chest pain, orthopnea, claudication, leg swelling and PND. Gastrointestinal: Negative for diarrhea, nausea and vomiting. Genitourinary: Negative for dysuria and hematuria. Musculoskeletal: Negative for joint pain. Skin: Negative for rash. Neurological: Positive for dizziness. Negative for seizures, loss of consciousness and headaches. Endo/Heme/Allergies: Negative for polydipsia. Does not bruise/bleed easily. Psychiatric/Behavioral: Negative for depression and substance abuse. The patient does not have insomnia. Allergies   Allergen Reactions    Penicillins Unknown (comments)     Pt states she's not allergic       Past Medical History:   Diagnosis Date    Atrial fibrillation ([facility] [address]     Balance problem     Chest pain, unspecified     Resolved.     Chronic diastolic heart failure (HCC)     Stable symptoms.  Congestive heart failure, unspecified     Diarrhea 12/15/2015    f/u PCP     Essential hypertension     High blood pressure     Low back pain     Morbid obesity (HCC)     On amiodarone therapy 6/26/2015 12/14 SGOT45, SGPT41, TSH 2.7 3/10 84%DLCO     Other and unspecified hyperlipidemia     Pacemaker     Reflux     Trauma     Unspecified disorder of thyroid     Min hyperthyroidism. Family History   Problem Relation Age of Onset    Heart Attack Father 37    Sudden Death Father     Hypertension Mother     Heart Disease Neg Hx      No history of ischemic heart disease.  Stroke Neg Hx        Social History   Substance Use Topics    Smoking status: Never Smoker    Smokeless tobacco: Never Used    Alcohol use No        Current Outpatient Prescriptions   Medication Sig    mometasone (NASONEX) 50 mcg/actuation nasal spray 2 Sprays daily.  benzonatate (TESSALON PERLES) 100 mg capsule Take 100 mg by mouth three (3) times daily as needed for Cough.  doxycycline (VIBRAMYCIN) 100 mg capsule Take 100 mg by mouth two (2) times a day.  cyclobenzaprine (FLEXERIL) 5 mg tablet Take 5 mg by mouth.  CALCIUM CARBONATE-VITAMIN D2 PO Take  by mouth.  polyethylene glycol (MIRALAX) 17 gram packet Take 17 g by mouth daily.  meloxicam (MOBIC) 7.5 mg tablet TAKE 1 TABLET BY MOUTH DAILY (Patient taking differently: 15 mg. TAKE 1 TABLET BY MOUTH DAILY)    amLODIPine (NORVASC) 2.5 mg tablet Take 1 Tab by mouth daily. (Patient taking differently: Take 10 mg by mouth daily.)    diclofenac (VOLTAREN) 1 % gel Apply 4 g to affected area four (4) times daily. Apply to right knee    amiodarone (CORDARONE) 200 mg tablet TAKE 1 TABLET BY MOUTH ONCE EVERY DAY    valsartan-hydrochlorothiazide (DIOVAN-HCT) 160-25 mg per tablet Take 1 Tab by mouth daily.  pravastatin (PRAVACHOL) 40 mg tablet Take 1 Tab by mouth nightly.     ezetimibe (ZETIA) 10 mg tablet Take 1 Tab by mouth daily.  acetaminophen-codeine (TYLENOL #3) 300-30 mg per tablet 1 po bid prn severe pain  Indications: PAIN    ranitidine (ZANTAC) 150 mg tablet Take 150 mg by mouth two (2) times a day. No current facility-administered medications for this visit. Past Surgical History:   Procedure Laterality Date    HX PACEMAKER  2002    Demand cardiac pacemaker-Had RV lead perforating and had sternot[facility]. Diagnostic Studies:  CARDIOLOGY STUDIES 3/10/2015 9/17/2013 6/1/2012 5/1/2012 3/1/2012 2/1/2012 6/1/2011   EKG Result - - - V paced, ?? underlying atrial rhythm. - - -   ICD / Pacemaker Check Result - - gen change office check NIKO - nl function -   Myocardial Perfusion Scan Result - - - - - - -   Echocardiogram - Complete Result - - - - 65% EF, mild MR/AI, PAP 48. - -   PFT's with DLCO Result 84%DLCO 94%DLCO - - - - see report   24 hr Holter Monitor Result - - - - - - -   Some recent data might be hidden       Visit Vitals    /57    Pulse 75    Ht 5' (1.524 m)    Wt 89.8 kg (198 lb)    BMI 38.67 kg/m2       Ms. Bon Guerra has a reminder for a \"due or due soon\" health maintenance. I have asked that she contact her primary care provider for follow-up on this health maintenance. Physical Exam   Constitutional: She is oriented to person, place, and time. She appears well-developed and well-nourished. No distress. HENT:   Head: Normocephalic and atraumatic. Mouth/Throat: Normal dentition. Eyes: Right eye exhibits no discharge. Left eye exhibits no discharge. No scleral icterus. Neck: Neck supple. No JVD present. Carotid bruit is not present. No thyromegaly present. Cardiovascular: Normal rate, regular rhythm, S1 normal, S2 normal, normal heart sounds and intact distal pulses. Exam reveals no gallop and no friction rub. No murmur heard. Pulmonary/Chest: Effort normal and breath sounds normal. She has no wheezes. She has no rales. Abdominal: Soft.  She exhibits no mass. There is no tenderness. Musculoskeletal: She exhibits edema (trace). Lymphadenopathy:        Right cervical: No superficial cervical adenopathy present. Left cervical: No superficial cervical adenopathy present. Neurological: She is alert and oriented to person, place, and time. Skin: Skin is warm and dry. No rash noted. Psychiatric: She has a normal mood and affect. Her behavior is normal.       ASSESSMENT and PLAN      Discussed with patient the risk of bleeding on anticoagulation. Patient understands. Patient also understands the reduced risk of stroke with anticoagulation due to atrial fibrillation. Monster Carrera was seen today for irregular heart beat, chf, hypertension and cholesterol problem. Diagnoses and all orders for this visit:    Paroxysmal atrial fibrillation ([facility] [address]  Comments:  6/17 PAF(158 episodes in 6 months, longest for 10.5 mts), start eliquis; 1/17 occ rare AHR on pacer testing; Staying SR on amio; Orders:  -     apixaban (ELIQUIS) 5 mg tablet; Take 1 Tab by mouth two (2) times a day. -     CBC W/O DIFF; Future  -     METABOLIC PANEL, BASIC; Future    Essential hypertension, benign  Comments:  6/17 controlled;  Orders:  -     CBC W/O DIFF; Future  -     METABOLIC PANEL, BASIC; Future    Chronic diastolic heart failure ([address]  Comments:  6/17; 1/17 NYHA 2;     Pure hypercholesterolemia  Comments:  3/17 LDL75; On amiodarone therapy  Comments:  LFT 12/14 SGOT45, SGPT41, 10/15, 5/16(min incr SGOT 51); 12/16; 6/17-stable SGOT  TFT nl 12/14, 6/15, 5/16; 6/17  3/10; 3/15 84%DLCO        Pertinent laboratory and test data reviewed and discussed with patient. See patient instructions also for other medical advice given    Medications Discontinued During This Encounter   Medication Reason    miscellaneous medical supply (T.E.D.  KNEE LENGTH-M-LONG) misc Not A Current Medication    diclofenac (VOLTAREN) 1 % gel Other    meloxicam (MOBIC) 7.5 mg tablet Other Follow-up Disposition:  Return in about 6 months (around 12/22/2017), or if symptoms worsen or fail to improve, for post test.

## 2017-06-22 NOTE — MR AVS SNAPSHOT
Visit Information Date & Time Provider Department Dept. Phone Encounter #  
 6/22/2017  1:15 PM Fransisco Giles MD [facility] [id number] Follow-up Instructions Return in about 6 months (around 12/22/2017), or if symptoms worsen or fail to improve, for post test.  
  
Your Appointments 8/8/2017  8:00 AM  
[facility] ([address] [place] [address] Road) Appt Note: [facility]/[address] [facility] [address]  
  
   
 [address] [facility]  
  
    
 11/14/2017  8:00 AM  
[facility] ([address] [place] [address]) Appt Note: [facility] [facility]  
[phone]  
  
    
 12/1/2017 10:30 AM  
ESTABLISHED PATIENT with Fransisco Giles MD  
Cardiology [facility] ([facility]) Appt Note: 6 month follow up/PFT  
 [address] [facility]. [facility] [address]  
  
   
 [address] [id number] [address] [address] Upcoming Health Maintenance Date Due DTaP/Tdap/Td series (1 - Tdap) 10/21/1956 ZOSTER VACCINE AGE 60> 10/21/1996 GLAUCOMA SCREENING Q2Y 10/21/2001 OSTEOPOROSIS SCREENING (DEXA) 10/21/2001 Pneumococcal 65+ Low/Medium Risk (1 of 2 - PCV13) 10/21/2001 MEDICARE YEARLY EXAM 10/21/2001 INFLUENZA AGE 9 TO ADULT 8/1/2017 Allergies as of 6/22/2017  Review Complete On: 6/22/2017 By: Fransisco Giles MD  
  
 Severity Noted Reaction Type Reactions Penicillins  03/18/2013    Unknown (comments) Pt states she's not allergic Current Immunizations  Never Reviewed No immunizations on file. Not reviewed this visit You Were Diagnosed With   
  
 Codes Comments Paroxysmal atrial fibrillation (HCC)    -  Primary ICD-10-CM: I48.0 ICD-9-CM: 427.31 6/17 PAF(158 episodes in 6 months, longest for 10.5 mts), start eliquis; 1/17 occ rare AHR on pacer testing; Staying SR on amio;   
 Essential hypertension, benign     ICD-10-CM: I10 
ICD-9-CM: 401.1 6/17 controlled;  
 Chronic diastolic heart failure (HCC)     ICD-10-CM: I50.32 
ICD-9-CM: 428.32 6/17; 1/17 NYHA 2;   
 Pure hypercholesterolemia     ICD-10-CM: E78.00 ICD-9-CM: 272.0 3/17 LDL75; On amiodarone therapy     ICD-10-CM: Z79.899 ICD-9-CM: V58.69 LFT 12/14 SGOT45, SGPT41, 10/15, 5/16(min incr SGOT 51); 12/16; 6/17-stable SGOT 
TFT nl 12/14, 6/15, 5/16; 6/17 
3/10; 3/15 84%DLCO Vitals BP Pulse Height(growth percentile) Weight(growth percentile) BMI OB Status 127/57 75 5' (1.524 m) 198 lb (89.8 kg) 38.67 kg/m2 Postmenopausal  
 Smoking Status Never Smoker Vitals History BMI and BSA Data Body Mass Index Body Surface Area  
 38.67 kg/m 2 1.95 m 2 Preferred Pharmacy Pharmacy Name Phone [address], [facility] [address] [place] [address] 22 [phone] [facility] [phone] Your Updated Medication List  
  
   
This list is accurate as of: 6/22/17  1:46 PM.  Always use your most recent med list.  
  
  
  
  
 acetaminophen-codeine 300-30 mg per tablet Commonly known as:  TYLENOL #3  
1 po bid prn severe pain  Indications: PAIN  
  
 amiodarone 200 mg tablet Commonly known as:  CORDARONE  
TAKE 1 TABLET BY MOUTH ONCE EVERY DAY  
  
 amLODIPine 2.5 mg tablet Commonly known as:  Sallie Dural Take 1 Tab by mouth daily. apixaban 5 mg tablet Commonly known as:  Elta Cheek Take 1 Tab by mouth two (2) times a day. CALCIUM CARBONATE-VITAMIN D2 PO Take  by mouth. cyclobenzaprine 5 mg tablet Commonly known as:  FLEXERIL Take 5 mg by mouth.  
  
 ezetimibe 10 mg tablet Commonly known as:  Wilton Pina Take 1 Tab by mouth daily. MIRALAX 17 gram packet Generic drug:  polyethylene glycol Take 17 g by mouth daily. mometasone 50 mcg/actuation nasal spray Commonly known as:  NASONEX  
2 Sprays daily. pravastatin 40 mg tablet Commonly known as:  PRAVACHOL Take 1 Tab by mouth nightly. raNITIdine 150 mg tablet Commonly known as:  ZANTAC Take 150 mg by mouth two (2) times a day. TESSALON PERLES 100 mg capsule Generic drug:  benzonatate Take 100 mg by mouth three (3) times daily as needed for Cough. valsartan-hydroCHLOROthiazide 160-25 mg per tablet Commonly known as:  DIOVAN-HCT Take 1 Tab by mouth daily. VIBRAMYCIN 100 mg capsule Generic drug:  doxycycline Take 100 mg by mouth two (2) times a day. Prescriptions Sent to Pharmacy Refills  
 apixaban (ELIQUIS) 5 mg tablet 6 Sig: Take 1 Tab by mouth two (2) times a day. Class: Normal  
 Pharmacy: [address] [place] [address] #: [phone] Route: Oral  
  
Follow-up Instructions Return in about 6 months (around 12/22/2017), or if symptoms worsen or fail to improve, for post test.  
  
To-Do List   
 07/05/2017 11:00 AM  
  Appointment with [address] [place] [address] at [address] [place] [address] ([phone]) 1. Do NOT use any inhaled medications 24 hours prior to your breathing test.   2. Do NOT eat a heavy meal or smoke any tobacco products two hours prior to the appointment time. 3. Dress in loose, comfortable clothing. 4. Bring your photo ID, insurance cards and, if provided, the written physician order. 5. Report to the Patient Registration department on the first floor 15-20 minutes prior to your appointment time to check in.   6. If you have questions or need to reschedule, please call [phone]. Around 07/17/2017 Lab:  CBC W/O DIFF Around 07/17/2017 Lab:  METABOLIC PANEL, BASIC Patient Instructions Medications Discontinued During This Encounter Medication Reason  miscellaneous medical supply (T.E.D. KNEE LENGTH-M-LONG) misc Not A Current Medication  diclofenac (VOLTAREN) 1 % gel Other  meloxicam (MOBIC) 7.5 mg tablet Other Orders Placed This Encounter  CBC W/O DIFF Standing Status:   Future Standing Expiration Date:   9/22/2017  METABOLIC PANEL, BASIC Standing Status:   Future Standing Expiration Date:   9/22/2017  apixaban (ELIQUIS) 5 mg tablet Sig: Take 1 Tab by mouth two (2) times a day. Dispense:  60 Tab Refill:  6 Atrial Fibrillation: Care Instructions Your Care Instructions Atrial fibrillation is an irregular and often fast heartbeat. Treating this condition is important for several reasons. It can cause blood clots, which can travel from your heart to your brain and cause a stroke. If you have a fast heartbeat, you may feel lightheaded, dizzy, and weak. An irregular heartbeat can also increase your risk for heart failure. Atrial fibrillation is often the result of another heart condition, such as high blood pressure or coronary artery disease. Making changes to improve your heart condition will help you stay healthy and active. Follow-up care is a key part of your treatment and safety. Be sure to make and go to all appointments, and call your doctor if you are having problems. It's also a good idea to know your test results and keep a list of the medicines you take. How can you care for yourself at home? Medicines · Take your medicines exactly as prescribed. Call your doctor if you think you are having a problem with your medicine. You will get more details on the specific medicines your doctor prescribes. · If your doctor has given you a blood thinner to prevent a stroke, be sure you get instructions about how to take your medicine safely. Blood thinners can cause serious bleeding problems. · Do not take any vitamins, over-the-counter drugs, or herbal products without talking to your doctor first. 
Lifestyle changes · Do not smoke. Smoking can increase your chance of a stroke and heart attack. If you need help quitting, talk to your doctor about stop-smoking programs and medicines. These can increase your chances of quitting for good. · Eat a heart-healthy diet. · Stay at a healthy weight. Lose weight if you need to. · Limit alcohol to 2 drinks a day for men and 1 drink a day for women. Too much alcohol can cause health problems. · Avoid colds and flu. Get a pneumococcal vaccine shot. If you have had one before, ask your doctor whether you need another dose. Get a flu shot every year. If you must be around people with colds or flu, wash your hands often. Activity · If your doctor recommends it, get more exercise. Walking is a good choice. Bit by bit, increase the amount you walk every day. Try for at least 30 minutes on most days of the week. You also may want to swim, bike, or do other activities. Your doctor may suggest that you join a cardiac rehabilitation program so that you can have help increasing your physical activity safely. · Start light exercise if your doctor says it is okay. Even a small amount will help you get stronger, have more energy, and manage stress. Walking is an easy way to get exercise. Start out by walking a little more than you did in the hospital. Gradually increase the amount you walk. · When you exercise, watch for signs that your heart is working too hard. You are pushing too hard if you cannot talk while you are exercising. If you become short of breath or dizzy or have chest pain, sit down and rest immediately. · Check your pulse regularly. Place two fingers on the artery at the palm side of your wrist, in line with your thumb. If your heartbeat seems uneven or fast, talk to your doctor. When should you call for help? Call 911 anytime you think you may need emergency care. For example, call if: 
· You have symptoms of a heart attack. These may include: ¨ Chest pain or pressure, or a strange feeling in the chest. 
¨ Sweating. ¨ Shortness of breath. ¨ Nausea or vomiting. ¨ Pain, pressure, or a strange feeling in the back, neck, jaw, or upper belly or in one or both shoulders or arms. ¨ Lightheadedness or sudden weakness. ¨ A fast or irregular heartbeat. After you call 911, the  may tell you to chew 1 adult-strength or 2 to 4 low-dose aspirin. Wait for an ambulance. Do not try to drive yourself. · You have symptoms of a stroke. These may include: 
¨ Sudden numbness, tingling, weakness, or loss of movement in your face, arm, or leg, especially on only one side of your body. ¨ Sudden vision changes. ¨ Sudden trouble speaking. ¨ Sudden confusion or trouble understanding simple statements. ¨ Sudden problems with walking or balance. ¨ A sudden, severe headache that is different from past headaches. · You passed out (lost consciousness). Call your doctor now or seek immediate medical care if: 
· You have new or increased shortness of breath. · You feel dizzy or lightheaded, or you feel like you may faint. · Your heart rate becomes irregular. · You can feel your heart flutter in your chest or skip heartbeats. Tell your doctor if these symptoms are new or worse. Watch closely for changes in your health, and be sure to contact your doctor if you have any problems. Where can you learn more? Go to http://margarita-eddie.info/. Enter U020 in the search box to learn more about \"Atrial Fibrillation: Care Instructions. \" Current as of: September 21, 2016 Content Version: 11.3 © 2915-1702 [employer]. Care instructions adapted under license by [employer] (which disclaims liability or warranty for this information).  If you have questions about a medical condition or this instruction, always ask your healthcare professional. [place] [address] any warranty or liability for your use of this information. Introducing [facility] [place] [facility]! Destin Garcia introduces Jamn patient portal. Now you can access parts of your medical record, email your doctor's office, and request medication refills online. 1. In your internet browser, go to [email or web address] 2. Click on the First Time User? Click Here link in the Sign In box. You will see the New Member Sign Up page. 3. Enter your Jamn Access Code exactly as it appears below. You will not need to use this code after youve completed the sign-up process. If you do not sign up before the expiration date, you must request a new code. · Jamn Access Code: [email or web address] Expires: 9/13/2017 10:44 AM 
 
4. Enter the last four digits of your Social Security Number (xxxx) and Date of Birth (mm/dd/yyyy) as indicated and click Submit. You will be taken to the next sign-up page. 5. Create a Jamn ID. This will be your Jamn login ID and cannot be changed, so think of one that is secure and easy to remember. 6. Create a Jamn password. You can change your password at any time. 7. Enter your Password Reset Question and Answer. This can be used at a later time if you forget your password. 8. Enter your e-mail address. You will receive e-mail notification when new information is available in [address] 9. Click Sign Up. You can now view and download portions of your medical record. 10. Click the Download Summary menu link to download a portable copy of your medical information. If you have questions, please visit the Frequently Asked Questions section of the Jamn website. Remember, Jamn is NOT to be used for urgent needs. For medical emergencies, dial 911. Now available from your iPhone and Android! Please provide this summary of care documentation to your next provider. Your primary care clinician is listed as Perlita Dale. If you have any questions after today's visit, please call [phone].

## 2017-06-22 NOTE — PATIENT INSTRUCTIONS
Medications Discontinued During This Encounter   Medication Reason    miscellaneous medical supply (T.E.D. KNEE LENGTH-M-LONG) misc Not A Current Medication    diclofenac (VOLTAREN) 1 % gel Other    meloxicam (MOBIC) 7.5 mg tablet Other       Orders Placed This Encounter    CBC W/O DIFF     Standing Status:   Future     Standing Expiration Date:   2/90/4202    METABOLIC PANEL, BASIC     Standing Status:   Future     Standing Expiration Date:   9/22/2017    apixaban (ELIQUIS) 5 mg tablet     Sig: Take 1 Tab by mouth two (2) times a day. Dispense:  60 Tab     Refill:  6          Atrial Fibrillation: Care Instructions  Your Care Instructions    Atrial fibrillation is an irregular and often fast heartbeat. Treating this condition is important for several reasons. It can cause blood clots, which can travel from your heart to your brain and cause a stroke. If you have a fast heartbeat, you may feel lightheaded, dizzy, and weak. An irregular heartbeat can also increase your risk for heart failure. Atrial fibrillation is often the result of another heart condition, such as high blood pressure or coronary artery disease. Making changes to improve your heart condition will help you stay healthy and active. Follow-up care is a key part of your treatment and safety. Be sure to make and go to all appointments, and call your doctor if you are having problems. It's also a good idea to know your test results and keep a list of the medicines you take. How can you care for yourself at home? Medicines  · Take your medicines exactly as prescribed. Call your doctor if you think you are having a problem with your medicine. You will get more details on the specific medicines your doctor prescribes. · If your doctor has given you a blood thinner to prevent a stroke, be sure you get instructions about how to take your medicine safely. Blood thinners can cause serious bleeding problems.   · Do not take any vitamins, over-the-counter drugs, or herbal products without talking to your doctor first.  Lifestyle changes  · Do not smoke. Smoking can increase your chance of a stroke and heart attack. If you need help quitting, talk to your doctor about stop-smoking programs and medicines. These can increase your chances of quitting for good. · Eat a heart-healthy diet. · Stay at a healthy weight. Lose weight if you need to. · Limit alcohol to 2 drinks a day for men and 1 drink a day for women. Too much alcohol can cause health problems. · Avoid colds and flu. Get a pneumococcal vaccine shot. If you have had one before, ask your doctor whether you need another dose. Get a flu shot every year. If you must be around people with colds or flu, wash your hands often. Activity  · If your doctor recommends it, get more exercise. Walking is a good choice. Bit by bit, increase the amount you walk every day. Try for at least 30 minutes on most days of the week. You also may want to swim, bike, or do other activities. Your doctor may suggest that you join a cardiac rehabilitation program so that you can have help increasing your physical activity safely. · Start light exercise if your doctor says it is okay. Even a small amount will help you get stronger, have more energy, and manage stress. Walking is an easy way to get exercise. Start out by walking a little more than you did in the hospital. Gradually increase the amount you walk. · When you exercise, watch for signs that your heart is working too hard. You are pushing too hard if you cannot talk while you are exercising. If you become short of breath or dizzy or have chest pain, sit down and rest immediately. · Check your pulse regularly. Place two fingers on the artery at the palm side of your wrist, in line with your thumb. If your heartbeat seems uneven or fast, talk to your doctor. When should you call for help? Call 911 anytime you think you may need emergency care.  For example, call if:  · You have symptoms of a heart attack. These may include:  ¨ Chest pain or pressure, or a strange feeling in the chest.  ¨ Sweating. ¨ Shortness of breath. ¨ Nausea or vomiting. ¨ Pain, pressure, or a strange feeling in the back, neck, jaw, or upper belly or in one or both shoulders or arms. ¨ Lightheadedness or sudden weakness. ¨ A fast or irregular heartbeat. After you call 911, the  may tell you to chew 1 adult-strength or 2 to 4 low-dose aspirin. Wait for an ambulance. Do not try to drive yourself. · You have symptoms of a stroke. These may include:  ¨ Sudden numbness, tingling, weakness, or loss of movement in your face, arm, or leg, especially on only one side of your body. ¨ Sudden vision changes. ¨ Sudden trouble speaking. ¨ Sudden confusion or trouble understanding simple statements. ¨ Sudden problems with walking or balance. ¨ A sudden, severe headache that is different from past headaches. · You passed out (lost consciousness). Call your doctor now or seek immediate medical care if:  · You have new or increased shortness of breath. · You feel dizzy or lightheaded, or you feel like you may faint. · Your heart rate becomes irregular. · You can feel your heart flutter in your chest or skip heartbeats. Tell your doctor if these symptoms are new or worse. Watch closely for changes in your health, and be sure to contact your doctor if you have any problems. Where can you learn more? Go to http://margarita-eddie.info/. Enter U020 in the search box to learn more about \"Atrial Fibrillation: Care Instructions. \"  Current as of: September 21, 2016  Content Version: 11.3  © 6616-2510 [employer]. Care instructions adapted under license by [employer] (which disclaims liability or warranty for this information).  If you have questions about a medical condition or this instruction, always ask your healthcare professional. [employer] disclaims any warranty or liability for your use of this information.

## 2017-07-21 DIAGNOSIS — I48.0 PAROXYSMAL ATRIAL FIBRILLATION (HCC): ICD-10-CM

## 2017-08-14 DIAGNOSIS — I10 ESSENTIAL HYPERTENSION, BENIGN: ICD-10-CM

## 2017-08-14 RX ORDER — VALSARTAN AND HYDROCHLOROTHIAZIDE 160; 25 MG/1; MG/1
TABLET ORAL
Qty: 90 TAB | Refills: 0 | Status: SHIPPED | OUTPATIENT
Start: 2017-08-14 | End: 2018-10-01 | Stop reason: ALTCHOICE

## 2017-09-08 DIAGNOSIS — M47.816 LUMBAR FACET ARTHROPATHY: ICD-10-CM

## 2017-09-08 DIAGNOSIS — M51.36 DDD (DEGENERATIVE DISC DISEASE), LUMBAR: ICD-10-CM

## 2017-09-08 NOTE — TELEPHONE ENCOUNTER
Attempted to call patient to inquire about message below. Reached unidentified voicemail, left message, identified myself/facility/callback number, requested return call to facility.

## 2017-09-12 RX ORDER — MELOXICAM 7.5 MG/1
TABLET ORAL
Qty: 90 TAB | Refills: 0 | OUTPATIENT
Start: 2017-09-12

## 2017-09-12 NOTE — TELEPHONE ENCOUNTER
Again, attempted to contact patient, Reached unidentified voicemail, left message, identified myself/facility/callback number, requested return call to facility. Closing encounter due to being unable to reach patient.

## 2017-09-14 DIAGNOSIS — E78.00 PURE HYPERCHOLESTEROLEMIA: ICD-10-CM

## 2017-09-14 RX ORDER — PRAVASTATIN SODIUM 40 MG/1
TABLET ORAL
Qty: 90 TAB | Refills: 0 | Status: SHIPPED | OUTPATIENT
Start: 2017-09-14 | End: 2017-12-14 | Stop reason: SDUPTHER

## 2017-10-11 DIAGNOSIS — I10 ESSENTIAL HYPERTENSION, BENIGN: ICD-10-CM

## 2017-10-11 RX ORDER — AMLODIPINE BESYLATE 5 MG/1
TABLET ORAL
Qty: 90 TAB | Refills: 0 | OUTPATIENT
Start: 2017-10-11

## 2017-10-11 RX ORDER — AMLODIPINE BESYLATE 10 MG/1
10 TABLET ORAL DAILY
Qty: 30 TAB | Refills: 6 | Status: SHIPPED | OUTPATIENT
Start: 2017-10-11 | End: 2017-10-27 | Stop reason: DRUGHIGH

## 2017-10-15 DIAGNOSIS — E78.00 PURE HYPERCHOLESTEROLEMIA: ICD-10-CM

## 2017-10-16 RX ORDER — EZETIMIBE 10 MG
TABLET ORAL
Qty: 90 TAB | Refills: 0 | Status: SHIPPED | OUTPATIENT
Start: 2017-10-16

## 2017-10-26 ENCOUNTER — TELEPHONE (OUTPATIENT)
Dept: CARDIOLOGY CLINIC | Age: 82
End: 2017-10-26

## 2017-10-26 NOTE — TELEPHONE ENCOUNTER
Patient and daughter came by the office today to do a BP check. 109/48 HR77    Our records, [facility] pharmacy and Dr. Nehemiah Vazquez office indicate she is taking amlodipine 10mg. However, patient's daughter states patient is taking 5mg but is unaware from whom she received a rx for 5mg as she has thrown away the bottle. Patient's family is concerned about patient's low BP and would like to know if she needs to be taking a amlodipine. Could you please advise as she is Dr. Arin Acosta patient.

## 2017-10-26 NOTE — TELEPHONE ENCOUNTER
Take 2.5mg daily and recheck BP in 1 week.  If she has dizziness or low bp at home, then discontinue amlodipine

## 2017-10-27 DIAGNOSIS — I10 ESSENTIAL HYPERTENSION: Primary | ICD-10-CM

## 2017-10-27 RX ORDER — AMLODIPINE BESYLATE 2.5 MG/1
2.5 TABLET ORAL DAILY
Qty: 30 TAB | Refills: 2 | Status: SHIPPED | OUTPATIENT
Start: 2017-10-27 | End: 2017-12-01

## 2017-10-27 NOTE — TELEPHONE ENCOUNTER
Spoke to [employer] (patient's son) explained to him patient needs to take Amlodipine  2.5mg and check BP every day at home, if any dizziness to d/c medication and let us know that she has. Patient will come back Nov 2 for us to re-check BP    Will call in new  to [place].

## 2017-11-02 ENCOUNTER — TELEPHONE (OUTPATIENT)
Dept: CARDIOLOGY CLINIC | Age: 82
End: 2017-11-02

## 2017-11-02 NOTE — TELEPHONE ENCOUNTER
Patient came in today for a bp check today and brought bp reading over the last week. Today 128/66 HR 89 no amlodipine     While taking amlodipine     10/27     126-66    10/28      103/59    10/29      128/67   6pm                 116/68   7pm    Without medication    10/30 107/59 11am    10/30 102/60 12noon    10/31 124/70    11/1    109/60    10:40   128/69    5:37pm    11/2    123/73    Patient feels better off of medication. It cause her to feel weak. Please advise.

## 2017-12-01 ENCOUNTER — OFFICE VISIT (OUTPATIENT)
Dept: CARDIOLOGY CLINIC | Age: 82
End: 2017-12-01

## 2017-12-01 VITALS
DIASTOLIC BLOOD PRESSURE: 51 MMHG | HEART RATE: 70 BPM | SYSTOLIC BLOOD PRESSURE: 126 MMHG | WEIGHT: 194 LBS | HEIGHT: 60 IN | BODY MASS INDEX: 38.09 KG/M2

## 2017-12-01 DIAGNOSIS — I48.0 PAROXYSMAL ATRIAL FIBRILLATION (HCC): ICD-10-CM

## 2017-12-01 DIAGNOSIS — Z79.899 ON AMIODARONE THERAPY: ICD-10-CM

## 2017-12-01 DIAGNOSIS — I50.32 CHRONIC DIASTOLIC HEART FAILURE (HCC): Primary | ICD-10-CM

## 2017-12-01 DIAGNOSIS — E78.00 PURE HYPERCHOLESTEROLEMIA: ICD-10-CM

## 2017-12-01 DIAGNOSIS — Z95.0 CARDIAC PACEMAKER IN SITU: ICD-10-CM

## 2017-12-01 DIAGNOSIS — I10 ESSENTIAL HYPERTENSION, BENIGN: ICD-10-CM

## 2017-12-01 RX ORDER — AMIODARONE HYDROCHLORIDE 200 MG/1
TABLET ORAL
Qty: 90 TAB | Refills: 1 | Status: SHIPPED | OUTPATIENT
Start: 2017-12-01 | End: 2018-06-24 | Stop reason: SDUPTHER

## 2017-12-01 NOTE — PATIENT INSTRUCTIONS
please get remote checks for pacer or ICD every 3 months and Office check in 1 yr  Please call our office after every transmission to confirm success of transmission  Medications Discontinued During This Encounter   Medication Reason    amiodarone (CORDARONE) 480 mg tablet Duplicate Order    doxycycline (VIBRAMYCIN) 100 mg capsule Therapy Completed    amLODIPine (NORVASC) 2.5 mg tablet Not A Current Medication    apixaban (ELIQUIS) 5 mg tablet Reorder    amiodarone (CORDARONE) 200 mg tablet Reorder       Orders Placed This Encounter    PFT DLCO     Standing Status:   Future     Standing Expiration Date:   3/3/2018    PULMONARY FUNCTION TEST     Standing Status:   Future     Standing Expiration Date:   3/3/2018    apixaban (ELIQUIS) 5 mg tablet     Sig: Take 1 Tab by mouth two (2) times a day. Dispense:  60 Tab     Refill:  6    amiodarone (CORDARONE) 200 mg tablet     Sig: TAKE 1 TABLET BY MOUTH ONCE EVERY DAY     Dispense:  90 Tab     Refill:  1          Learning About Atrial Fibrillation  What is atrial fibrillation? Atrial fibrillation (say \"AY-tree-urban fdl-cyhe-TPB-shun\") is the most common type of irregular heartbeat (arrhythmia). Normally, the heart beats in a strong, steady rhythm. In atrial fibrillation, a problem with the heart's electrical system causes the two upper parts of the heart (the atria) to quiver, or fibrillate. Your heart rate also may be faster than normal.  Atrial fibrillation can be dangerous because if the heartbeat isn't strong and steady, blood can collect, or pool, in the atria. And pooled blood is more likely to form clots. Clots can travel to the brain, block blood flow, and cause a stroke. Atrial fibrillation can also lead to heart failure. Treatment for atrial fibrillation helps prevent stroke and heart failure. It also helps relieve symptoms. Atrial fibrillation is often caused by another heart problem. It may happen after heart surgery.  It may also be caused by other problems, such as an overactive thyroid gland or lung disease. Many people with atrial fibrillation are able to live full and active lives. What are the symptoms? Some people feel symptoms when they have episodes of atrial fibrillation. But other people don't notice any symptoms. If you have symptoms, you may feel:  · A fluttering, racing, or pounding feeling in your chest called palpitations. · Weak or tired. · Dizzy or lightheaded. · Short of breath. · Chest pain. · Confused. You may notice signs of atrial fibrillation when you check your pulse. Your pulse may seem uneven or fast.  What can you expect when you have atrial fibrillation? At first, spells of atrial fibrillation may come on suddenly and last a short time. It may go away on its own or it goes away after treatment. This is called paroxysmal atrial fibrillation. Over time, the spells may last longer and occur more often. They often don't go away on their own. How is it treated? Treatments can help you feel better and prevent future problems, especially stroke and heart failure. The main types of treatment slow the heart rate, control the heart rhythm, and help prevent stroke. Your treatment will depend on the cause of your atrial fibrillation, your symptoms, and your risk for stroke. · Heart rate treatment. Medicine may be used to slow your heart rate. Your heartbeat may still be irregular. But these medicines keep your heart from beating too fast. They may also help relieve your symptoms. · Heart rhythm treatment. Different treatments may be used to try to stop atrial fibrillation and keep it from returning. They can also relieve symptoms. These treatments include medicine, electrical cardioversion to shock the heart back to a normal rhythm, a procedure called catheter ablation, and heart surgery. · Stroke prevention. You and your doctor can decide how to lower your risk.  You may decide to take a blood-thinning medicine, such as aspirin or an anticoagulant. How can you live well with it? You can live well and help manage atrial fibrillation by having a heart-healthy lifestyle. To have a heart-healthy lifestyle:  · Don't smoke. · Eat heart-healthy foods. · Be active. Talk to your doctor about what type and level of exercise is safe for you. · Stay at a healthy weight. Lose weight if you need to. · Manage stress. · Avoid alcohol if it triggers symptoms. · Manage other health problems such as high blood pressure, high cholesterol, and diabetes. · Avoid getting sick from the flu. Get a flu shot every year. Where can you learn more? Go to http://margaritaMonkimuneddie.info/. Enter [phone] in the search box to learn more about \"Learning About Atrial Fibrillation. \"  Current as of: September 21, 2016  Content Version: 11.4  © 0943-0224 [employer]. Care instructions adapted under license by [employer] (which disclaims liability or warranty for this information). If you have questions about a medical condition or this instruction, always ask your healthcare professional. [address] any warranty or liability for your use of this information.

## 2017-12-01 NOTE — LETTER
Kalina Brody 
1935 
 
12/1/2017 Dear Easton Benedict MD 
 
I had the pleasure of evaluating  Ms. Ilene Day in office today. Below are the relevant portions of my assessment and plan of care. ICD-10-CM ICD-9-CM 1. Chronic diastolic heart failure ([facility] [address] I50.32 428.32   
 12/17; 6/17; 1/17 NYHA 2;   
2. Paroxysmal atrial fibrillation (HCC) I48.0 427.31 apixaban (ELIQUIS) 5 mg tablet  
   amiodarone (CORDARONE) 200 mg tablet PFT DLCO PULMONARY FUNCTION TEST  
 12/17 adv to take eliquis bid; 6/17 PAF(158 episodes in 6 months, longest for 10.5 mts), start eliquis; 3. Essential hypertension, benign I10 401.1   
 12/17 no norvasc now; 6/17 controlled; 1/17 reduce norvasc to 2.5 
8/16 reduce norvasc to 5 Controlled. 4. Pure hypercholesterolemia E78.00 272.0   
 3/17 LDL75;  
5. On amiodarone therapy Z79.899 V58.69 PFT DLCO PULMONARY FUNCTION TEST  
 LFT 12/14 SGOT45, SGPT41, 10/15, 5/16(min incr SGOT 51); 12/16; 6/17-stable SGOT; nl 10/17 TFT nl 12/14, 6/15, 5/16; 6/17 
3/10; 3/15 84%DLCO 6. Cardiac pacemaker in situ-DDD Z95.0 V45.01   
 care link asap 7. Paroxysmal atrial fibrillation (HCC) I48.0 427.31 apixaban (ELIQUIS) 5 mg tablet  
   amiodarone (CORDARONE) 200 mg tablet PFT DLCO PULMONARY FUNCTION TEST  
 6/17 PAF(158 episodes in 6 months, longest for 10.5 mts), start eliquis; 1/17 occ rare AHR on pacer testing; Staying SR on amio;   
8. Paroxysmal atrial fibrillation (HCC) I48.0 427.31 apixaban (ELIQUIS) 5 mg tablet  
   amiodarone (CORDARONE) 200 mg tablet PFT DLCO PULMONARY FUNCTION TEST Staying SR on amio Current Outpatient Prescriptions Medication Sig Dispense Refill  apixaban (ELIQUIS) 5 mg tablet Take 1 Tab by mouth two (2) times a day. 60 Tab 6  
 amiodarone (CORDARONE) 200 mg tablet TAKE 1 TABLET BY MOUTH ONCE EVERY DAY 90 Tab 1  ZETIA 10 mg tablet TAKE 1 TABLET BY MOUTH DAILY 90 Tab 0  
  pravastatin (PRAVACHOL) 40 mg tablet TAKE 1 TABLET BY MOUTH EVERY NIGHT 90 Tab 0  
 valsartan-hydroCHLOROthiazide (DIOVAN-HCT) 160-25 mg per tablet TAKE 1 TABLET BY MOUTH DAILY 90 Tab 0  
 mometasone (NASONEX) 50 mcg/actuation nasal spray 2 Sprays daily.  benzonatate (TESSALON PERLES) 100 mg capsule Take 100 mg by mouth three (3) times daily as needed for Cough.  cyclobenzaprine (FLEXERIL) 5 mg tablet Take 5 mg by mouth.  CALCIUM CARBONATE-VITAMIN D2 PO Take  by mouth.  polyethylene glycol (MIRALAX) 17 gram packet Take 17 g by mouth daily.  acetaminophen-codeine (TYLENOL #3) 300-30 mg per tablet 1 po bid prn severe pain  Indications: PAIN 60 Tab 0  
 ranitidine (ZANTAC) 150 mg tablet Take 150 mg by mouth two (2) times a day. Orders Placed This Encounter  PFT DLCO Standing Status:   Future Standing Expiration Date:   3/3/2018  PULMONARY FUNCTION TEST Standing Status:   Future Standing Expiration Date:   3/3/2018  apixaban (ELIQUIS) 5 mg tablet Sig: Take 1 Tab by mouth two (2) times a day. Dispense:  60 Tab Refill:  6  
 amiodarone (CORDARONE) 200 mg tablet Sig: TAKE 1 TABLET BY MOUTH ONCE EVERY DAY Dispense:  90 Tab Refill:  1 If you have questions, please do not hesitate to call me. I look forward to following Ms. Gomez along with you. Sincerely, Rohit Rogers MD

## 2017-12-01 NOTE — PROGRESS NOTES
1. Have you been to the ER, urgent care clinic since your last visit? Hospitalized since your last visit?     no  2. Have you seen or consulted any other health care providers outside of the [address] [place] [address] since your last visit? Include any pap smears or colon screening. Yes Where: [facility]     3. Since your last visit, have you had any of the following symptoms?      dizziness. 4.  Have you had any blood work, X-rays or cardiac testing? No         5. Where do you normally have your labs drawn? 6. Do you need any refills today?    yes

## 2017-12-01 NOTE — MR AVS SNAPSHOT
Visit Information Date & Time Provider Department Dept. Phone Encounter #  
 12/1/2017 10:30 AM Rina Loja MD [facility] [phone] [id number] Follow-up Instructions Return in about 6 months (around 6/1/2018), or if symptoms worsen or fail to improve, for with pacer/ICD check, post test.  
  
Your Appointments 6/15/2018 11:00 AM  
PROCEDURE with Rina Loja MD  
[facility] ([facility] Appt Note: 6 month follow up and pacer check in office [facility] [address] [place] [address] [address]  
  
   
 [address] [address] Upcoming Health Maintenance Date Due DTaP/Tdap/Td series (1 - Tdap) 10/21/1956 ZOSTER VACCINE AGE 60> 8/21/1996 GLAUCOMA SCREENING Q2Y 10/21/2001 OSTEOPOROSIS SCREENING (DEXA) 10/21/2001 Pneumococcal 65+ Low/Medium Risk (1 of 2 - PCV13) 10/21/2001 MEDICARE YEARLY EXAM 10/21/2001 Influenza Age 5 to Adult 8/1/2017 Allergies as of 12/1/2017  Review Complete On: 12/1/2017 By: Rnia Loja MD  
  
 Severity Noted Reaction Type Reactions Penicillins  03/18/2013    Unknown (comments) Pt states she's not allergic Current Immunizations  Never Reviewed No immunizations on file. Not reviewed this visit You Were Diagnosed With   
  
 Codes Comments Chronic diastolic heart failure ([facility] [address]    -  Primary ICD-10-CM: I50.32 
ICD-9-CM: 428.32 12/17; 6/17; 1/17 NYHA 2;   
 Paroxysmal atrial fibrillation (HCC)     ICD-10-CM: I48.0 ICD-9-CM: 427.31 12/17 adv to take eliquis bid; 6/17 PAF(158 episodes in 6 months, longest for 10.5 mts), start eliquis;  
 Essential hypertension, benign     ICD-10-CM: I10 
ICD-9-CM: 401.1 12/17 no norvasc now; 6/17 controlled; 1/17 reduce norvasc to 2.5 
8/16 reduce norvasc to 5 Controlled. Pure hypercholesterolemia     ICD-10-CM: E78.00 ICD-9-CM: 272.0 3/17 LDL75;  
 On amiodarone therapy     ICD-10-CM: Z79.899 ICD-9-CM: V58.69 LFT 12/14 SGOT45, SGPT41, 10/15, 5/16(min incr SGOT 51); 12/16; 6/17-stable SGOT; nl 10/17 TFT nl 12/14, 6/15, 5/16; 6/17 
3/10; 3/15 84%DLCO Cardiac pacemaker in situ     ICD-10-CM: Z95.0 ICD-9-CM: V45.01 care link asap Paroxysmal atrial fibrillation (HCC)     ICD-10-CM: I48.0 ICD-9-CM: 427.31 6/17 PAF(158 episodes in 6 months, longest for 10.5 mts), start eliquis; 1/17 occ rare AHR on pacer testing; Staying SR on amio;   
 Paroxysmal atrial fibrillation (HCC)     ICD-10-CM: I48.0 ICD-9-CM: 427.31 Staying SR on amio Vitals BP Pulse Height(growth percentile) Weight(growth percentile) BMI OB Status 126/51 70 5' (1.524 m) 194 lb (88 kg) 37.89 kg/m2 Postmenopausal  
 Smoking Status Never Smoker Vitals History BMI and BSA Data Body Mass Index Body Surface Area  
 37.89 kg/m 2 1.93 m 2 Preferred Pharmacy Pharmacy Name Phone [address], [address] [place] [address] [phone] [facility] [phone] Your Updated Medication List  
  
   
This list is accurate as of: 12/1/17 11:27 AM.  Always use your most recent med list.  
  
  
  
  
 acetaminophen-codeine 300-30 mg per tablet Commonly known as:  TYLENOL #3  
1 po bid prn severe pain  Indications: PAIN  
  
 amiodarone 200 mg tablet Commonly known as:  CORDARONE  
TAKE 1 TABLET BY MOUTH ONCE EVERY DAY  
  
 apixaban 5 mg tablet Commonly known as:  Kathleen Scripture Take 1 Tab by mouth two (2) times a day. CALCIUM CARBONATE-VITAMIN D2 PO Take  by mouth. cyclobenzaprine 5 mg tablet Commonly known as:  FLEXERIL Take 5 mg by mouth. MIRALAX 17 gram packet Generic drug:  polyethylene glycol Take 17 g by mouth daily. mometasone 50 mcg/actuation nasal spray Commonly known as:  NASONEX  
2 Sprays daily. pravastatin 40 mg tablet Commonly known as:  PRAVACHOL  
 TAKE 1 TABLET BY MOUTH EVERY NIGHT  
  
 raNITIdine 150 mg tablet Commonly known as:  ZANTAC Take 150 mg by mouth two (2) times a day. TESSALON PERLES 100 mg capsule Generic drug:  benzonatate Take 100 mg by mouth three (3) times daily as needed for Cough. valsartan-hydroCHLOROthiazide 160-25 mg per tablet Commonly known as:  DIOVAN-HCT  
TAKE 1 TABLET BY MOUTH DAILY  
  
 ZETIA 10 mg tablet Generic drug:  ezetimibe TAKE 1 TABLET BY MOUTH DAILY Prescriptions Sent to Pharmacy Refills  
 amiodarone (CORDARONE) 200 mg tablet 1 Sig: TAKE 1 TABLET BY MOUTH ONCE EVERY DAY Class: Normal  
 Pharmacy: [address] [place] [address] #: [phone] Follow-up Instructions Return in about 6 months (around 6/1/2018), or if symptoms worsen or fail to improve, for with pacer/ICD check, post test.  
  
To-Do List   
 Around 12/02/2017 PFT:  PFT DLCO Around 12/08/2017 PFT:  PULMONARY FUNCTION TEST Patient Instructions   
please get remote checks for pacer or ICD every 3 months and Office check in 1 yr Please call our office after every transmission to confirm success of transmission Medications Discontinued During This Encounter Medication Reason  amiodarone (CORDARONE) 684 mg tablet Duplicate Order  doxycycline (VIBRAMYCIN) 100 mg capsule Therapy Completed  amLODIPine (NORVASC) 2.5 mg tablet Not A Current Medication  apixaban (ELIQUIS) 5 mg tablet Reorder  amiodarone (CORDARONE) 200 mg tablet Reorder Orders Placed This Encounter  PFT DLCO Standing Status:   Future Standing Expiration Date:   3/3/2018  PULMONARY FUNCTION TEST Standing Status:   Future Standing Expiration Date:   3/3/2018  apixaban (ELIQUIS) 5 mg tablet Sig: Take 1 Tab by mouth two (2) times a day. Dispense:  60 Tab Refill:  6  amiodarone (CORDARONE) 200 mg tablet Sig: TAKE 1 TABLET BY MOUTH ONCE EVERY DAY Dispense:  90 Tab Refill:  1 Learning About Atrial Fibrillation What is atrial fibrillation? Atrial fibrillation (say \"CINDA-tree-urban skm-ctka-XJQ-shun\") is the most common type of irregular heartbeat (arrhythmia). Normally, the heart beats in a strong, steady rhythm. In atrial fibrillation, a problem with the heart's electrical system causes the two upper parts of the heart (the atria) to quiver, or fibrillate. Your heart rate also may be faster than normal. 
Atrial fibrillation can be dangerous because if the heartbeat isn't strong and steady, blood can collect, or pool, in the atria. And pooled blood is more likely to form clots. Clots can travel to the brain, block blood flow, and cause a stroke. Atrial fibrillation can also lead to heart failure. Treatment for atrial fibrillation helps prevent stroke and heart failure. It also helps relieve symptoms. Atrial fibrillation is often caused by another heart problem. It may happen after heart surgery. It may also be caused by other problems, such as an overactive thyroid gland or lung disease. Many people with atrial fibrillation are able to live full and active lives. What are the symptoms? Some people feel symptoms when they have episodes of atrial fibrillation. But other people don't notice any symptoms. If you have symptoms, you may feel: · A fluttering, racing, or pounding feeling in your chest called palpitations. · Weak or tired. · Dizzy or lightheaded. · Short of breath. · Chest pain. · Confused. You may notice signs of atrial fibrillation when you check your pulse. Your pulse may seem uneven or fast. 
What can you expect when you have atrial fibrillation? At first, spells of atrial fibrillation may come on suddenly and last a short time. It may go away on its own or it goes away after treatment. This is called paroxysmal atrial fibrillation. Over time, the spells may last longer and occur more often. They often don't go away on their own. How is it treated? Treatments can help you feel better and prevent future problems, especially stroke and heart failure. The main types of treatment slow the heart rate, control the heart rhythm, and help prevent stroke. Your treatment will depend on the cause of your atrial fibrillation, your symptoms, and your risk for stroke. · Heart rate treatment. Medicine may be used to slow your heart rate. Your heartbeat may still be irregular. But these medicines keep your heart from beating too fast. They may also help relieve your symptoms. · Heart rhythm treatment. Different treatments may be used to try to stop atrial fibrillation and keep it from returning. They can also relieve symptoms. These treatments include medicine, electrical cardioversion to shock the heart back to a normal rhythm, a procedure called catheter ablation, and heart surgery. · Stroke prevention. You and your doctor can decide how to lower your risk. You may decide to take a blood-thinning medicine, such as aspirin or an anticoagulant. How can you live well with it? You can live well and help manage atrial fibrillation by having a heart-healthy lifestyle. To have a heart-healthy lifestyle: · Don't smoke. · Eat heart-healthy foods. · Be active. Talk to your doctor about what type and level of exercise is safe for you. · Stay at a healthy weight. Lose weight if you need to. · Manage stress. · Avoid alcohol if it triggers symptoms. · Manage other health problems such as high blood pressure, high cholesterol, and diabetes. · Avoid getting sick from the flu. Get a flu shot every year. Where can you learn more? Go to http://margarita-eddie.info/. Enter [phone] in the search box to learn more about \"Learning About Atrial Fibrillation. \" Current as of: September 21, 2016 Content Version: 11.4 © 4835-4852 Healthwise, Incorporated. Care instructions adapted under license by [employer] (which disclaims liability or warranty for this information). If you have questions about a medical condition or this instruction, always ask your healthcare professional. [address] any warranty or liability for your use of this information. Introducing [facility] [place] [facility]! Milagros Murrieta introduces [facility] Explorer patient portal. Now you can access parts of your medical record, email your doctor's office, and request medication refills online. 1. In your internet browser, go to [email or web address] 2. Click on the First Time User? Click Here link in the Sign In box. You will see the New Member Sign Up page. 3. Enter your [facility] Access Code exactly as it appears below. You will not need to use this code after youve completed the sign-up process. If you do not sign up before the expiration date, you must request a new code. · [facility] Explorer Access Code: [email or web address] Expires: 3/1/2018 10:40 AM 
 
4. Enter the last four digits of your Social Security Number (xxxx) and Date of Birth (mm/dd/yyyy) as indicated and click Submit. You will be taken to the next sign-up page. 5. Create a [facility] Explorer ID. This will be your [facility] login ID and cannot be changed, so think of one that is secure and easy to remember. 6. Create a [facility] password. You can change your password at any time. 7. Enter your Password Reset Question and Answer. This can be used at a later time if you forget your password. 8. Enter your e-mail address. You will receive e-mail notification when new information is available in [address] 9. Click Sign Up. You can now view and download portions of your medical record. 10. Click the Download Summary menu link to download a portable copy of your medical information.  
 
If you have questions, please visit the Frequently Asked Questions section of the Mettl. Remember, [employer] is NOT to be used for urgent needs. For medical emergencies, dial 911. Now available from your iPhone and Android! Please provide this summary of care documentation to your next provider. Your primary care clinician is listed as [employer]. If you have any questions after today's visit, please call [phone].

## 2017-12-01 NOTE — PROGRESS NOTES
HISTORY OF PRESENT ILLNESS  Margarita Garcia is a 81 y.o. female. HPI Comments:  f/u of CHF, HTN, DDD, obesity  6/17 has cough x 3wks, improving slowly  12/15 diarrhoea and diverticulitis for 1-2 wks; also happened in 11/15    Palpitations    The history is provided by the medical records (h/o Parox AFib on pacer check). This is a recurrent problem. The current episode started more than 1 week ago. The problem occurs daily. The problem is associated with exercise (walking). Associated symptoms include lower extremity edema, dizziness and shortness of breath. Pertinent negatives include no fever, no malaise/fatigue, no chest pain, no claudication, no orthopnea, no PND, no nausea, no vomiting, no headaches and no cough. Her past medical history is significant for hypertension. CHF   The history is provided by the medical records. This is a chronic problem. The problem has not changed since onset. Associated symptoms include shortness of breath. Pertinent negatives include no chest pain and no headaches. Hypertension   The history is provided by the medical records. This is a chronic problem. Associated symptoms include shortness of breath. Pertinent negatives include no chest pain and no headaches. Cholesterol Problem   The history is provided by the medical records. This is a chronic problem. Associated symptoms include shortness of breath. Pertinent negatives include no chest pain and no headaches. Leg Swelling   The history is provided by the patient. This is a chronic problem. The current episode started more than 1 week ago. The problem occurs every several days (minimal). The problem has been resolved. Associated symptoms include shortness of breath. Pertinent negatives include no chest pain and no headaches. The symptoms are aggravated by standing. The symptoms are relieved by sleep. Shortness of Breath   The history is provided by the patient. This is a recurrent problem.  The problem occurs frequently. The current episode started more than 1 week ago. The problem has not changed since onset. Pertinent negatives include no fever, no headaches, no cough, no wheezing, no PND, no orthopnea, no chest pain, no vomiting, no rash, no leg swelling and no claudication. The problem's precipitants include exercise (walking 500-600 steps; 8/16 1000 steps;1/17 700 steps; 12/17 3-4 mts). Dizziness   The history is provided by the patient. This is a new problem. The current episode started more than 1 week ago (6/15). The problem occurs rarely (<1/month). The problem has not changed since onset. Associated symptoms include shortness of breath. Pertinent negatives include no chest pain and no headaches. Associated symptoms comments: No falls  . The symptoms are aggravated by standing. The symptoms are relieved by sleep. She has tried nothing for the symptoms. Review of Systems   Constitutional: Negative for chills, fever, malaise/fatigue and weight loss. HENT: Negative for nosebleeds. Eyes: Negative for discharge. Respiratory: Positive for shortness of breath. Negative for cough and wheezing. Cardiovascular: Positive for palpitations. Negative for chest pain, orthopnea, claudication, leg swelling and PND. Gastrointestinal: Negative for diarrhea, nausea and vomiting. Genitourinary: Negative for dysuria and hematuria. Musculoskeletal: Negative for joint pain. Skin: Negative for rash. Neurological: Positive for dizziness. Negative for seizures, loss of consciousness and headaches. Endo/Heme/Allergies: Negative for polydipsia. Does not bruise/bleed easily. Psychiatric/Behavioral: Negative for depression and substance abuse. The patient does not have insomnia.       Allergies   Allergen Reactions    Penicillins Unknown (comments)     Pt states she's not allergic       Past Medical History:   Diagnosis Date    Atrial fibrillation ([facility] [address]     Balance problem     Chest pain, unspecified Resolved.  Chronic diastolic heart failure (HCC)     Stable symptoms.  Congestive heart failure, unspecified     Diarrhea 12/15/2015    f/u PCP     Essential hypertension     High blood pressure     Low back pain     Morbid obesity (HCC)     On amiodarone therapy 6/26/2015 12/14 SGOT45, SGPT41, TSH 2.7 3/10 84%DLCO     Other and unspecified hyperlipidemia     Pacemaker     Reflux     Trauma     Unspecified disorder of thyroid     Min hyperthyroidism. Family History   Problem Relation Age of Onset    Heart Attack Father 37    Sudden Death Father     Hypertension Mother     Heart Disease Neg Hx      No history of ischemic heart disease.  Stroke Neg Hx        Social History   Substance Use Topics    Smoking status: Never Smoker    Smokeless tobacco: Never Used    Alcohol use No        Current Outpatient Prescriptions   Medication Sig    amLODIPine (NORVASC) 2.5 mg tablet Take 1 Tab by mouth daily.  ZETIA 10 mg tablet TAKE 1 TABLET BY MOUTH DAILY    pravastatin (PRAVACHOL) 40 mg tablet TAKE 1 TABLET BY MOUTH EVERY NIGHT    valsartan-hydroCHLOROthiazide (DIOVAN-HCT) 160-25 mg per tablet TAKE 1 TABLET BY MOUTH DAILY    apixaban (ELIQUIS) 5 mg tablet Take 1 Tab by mouth two (2) times a day.  mometasone (NASONEX) 50 mcg/actuation nasal spray 2 Sprays daily.  benzonatate (TESSALON PERLES) 100 mg capsule Take 100 mg by mouth three (3) times daily as needed for Cough.  doxycycline (VIBRAMYCIN) 100 mg capsule Take 100 mg by mouth two (2) times a day.  cyclobenzaprine (FLEXERIL) 5 mg tablet Take 5 mg by mouth.  CALCIUM CARBONATE-VITAMIN D2 PO Take  by mouth.  polyethylene glycol (MIRALAX) 17 gram packet Take 17 g by mouth daily.     amiodarone (CORDARONE) 200 mg tablet TAKE 1 TABLET BY MOUTH ONCE EVERY DAY    acetaminophen-codeine (TYLENOL #3) 300-30 mg per tablet 1 po bid prn severe pain  Indications: PAIN    ranitidine (ZANTAC) 150 mg tablet Take 150 mg by mouth two (2) times a day. No current facility-administered medications for this visit. Past Surgical History:   Procedure Laterality Date    HX PACEMAKER  2002    Demand cardiac pacemaker-Had RV lead perforating and had sternotomy at [facility]. Diagnostic Studies:  CARDIOLOGY STUDIES 3/10/2015 9/17/2013 6/1/2012 5/1/2012 3/1/2012 2/1/2012 6/1/2011   EKG Result - - - V paced, ?? underlying atrial rhythm. - - -   ICD / Pacemaker Check Result - - gen change office check NIKO - nl function -   Myocardial Perfusion Scan Result - - - - - - -   Echocardiogram - Complete Result - - - - 65% EF, mild MR/AI, PAP 48. - -   PFT's with DLCO Result 84%DLCO 94%DLCO - - - - see report   24 hr Holter Monitor Result - - - - - - -   Some recent data might be hidden       Visit Vitals    /51    Pulse 70    Ht 5' (1.524 m)    Wt 88 kg (194 lb)    BMI 37.89 kg/m2       Ms. Enrique Gutierres has a reminder for a \"due or due soon\" health maintenance. I have asked that she contact her primary care provider for follow-up on this health maintenance. Physical Exam   Constitutional: She is oriented to person, place, and time. She appears well-developed and well-nourished. No distress. HENT:   Head: Normocephalic and atraumatic. Mouth/Throat: Normal dentition. Eyes: Right eye exhibits no discharge. Left eye exhibits no discharge. No scleral icterus. Neck: Neck supple. No JVD present. Carotid bruit is not present. No thyromegaly present. Cardiovascular: Normal rate, regular rhythm, S1 normal, S2 normal, normal heart sounds and intact distal pulses. Exam reveals no gallop and no friction rub. No murmur heard. Pulmonary/Chest: Effort normal and breath sounds normal. She has no wheezes. She has no rales. Abdominal: Soft. She exhibits no mass. There is no tenderness. Musculoskeletal: She exhibits edema (trace). Lymphadenopathy:        Right cervical: No superficial cervical adenopathy present.        Left cervical: No superficial cervical adenopathy present. Neurological: She is alert and oriented to person, place, and time. Skin: Skin is warm and dry. No rash noted. Psychiatric: She has a normal mood and affect. Her behavior is normal.       ASSESSMENT and PLAN      Discussed with patient the risk of bleeding on anticoagulation. Patient understands. Patient also understands the reduced risk of stroke with anticoagulation due to atrial fibrillation. Diagnoses and all orders for this visit:    1. Chronic diastolic heart failure ([facility] [address]  Comments:  12/17; 6/17; 1/17 NYHA 2;     2. Paroxysmal atrial fibrillation ([facility] [address]  Comments:  12/17 adv to take eliquis bid; 6/17 PAF(158 episodes in 6 months, longest for 10.5 mts), start eliquis; Orders:  -     apixaban (ELIQUIS) 5 mg tablet; Take 1 Tab by mouth two (2) times a day. -     amiodarone (CORDARONE) 200 mg tablet; TAKE 1 TABLET BY MOUTH ONCE EVERY DAY  -     PFT DLCO; Future  -     PULMONARY FUNCTION TEST; Future    3. Essential hypertension, benign  Comments:  12/17 no norvasc now; 6/17 controlled; 1/17 reduce norvasc to 2.5  8/16 reduce norvasc to 5  Controlled. 4. Pure hypercholesterolemia  Comments:  3/17 LDL75;    5. On amiodarone therapy  Comments:  LFT 12/14 SGOT45, SGPT41, 10/15, 5/16(min incr SGOT 51); 12/16; 6/17-stable SGOT; nl 10/17  TFT nl 12/14, 6/15, 5/16; 6/17  3/10; 3/15 84%DLCO  Orders:  -     PFT DLCO; Future  -     PULMONARY FUNCTION TEST; Future    6. Cardiac pacemaker in situ-DDD  Comments:  care link asap    7. Paroxysmal atrial fibrillation (HCC)  Comments:  6/17 PAF(158 episodes in 6 months, longest for 10.5 mts), start eliquis; 1/17 occ rare AHR on pacer testing; Staying SR on amio; Orders:  -     apixaban (ELIQUIS) 5 mg tablet; Take 1 Tab by mouth two (2) times a day. -     amiodarone (CORDARONE) 200 mg tablet; TAKE 1 TABLET BY MOUTH ONCE EVERY DAY  -     PFT DLCO; Future  -     PULMONARY FUNCTION TEST; Future    8.  Paroxysmal atrial fibrillation ([facility] [address]  Comments:  Staying SR on amio  Orders:  -     apixaban (ELIQUIS) 5 mg tablet; Take 1 Tab by mouth two (2) times a day. -     amiodarone (CORDARONE) 200 mg tablet; TAKE 1 TABLET BY MOUTH ONCE EVERY DAY  -     PFT DLCO; Future  -     PULMONARY FUNCTION TEST; Future        Pertinent laboratory and test data reviewed and discussed with patient. See patient instructions also for other medical advice given    Medications Discontinued During This Encounter   Medication Reason    amiodarone (CORDARONE) 461 mg tablet Duplicate Order    doxycycline (VIBRAMYCIN) 100 mg capsule Therapy Completed    amLODIPine (NORVASC) 2.5 mg tablet Not A Current Medication    apixaban (ELIQUIS) 5 mg tablet Reorder    amiodarone (CORDARONE) 200 mg tablet Reorder       Follow-up Disposition:  Return in about 6 months (around 6/1/2018), or if symptoms worsen or fail to improve.

## 2017-12-11 ENCOUNTER — HOSPITAL ENCOUNTER (OUTPATIENT)
Dept: RESPIRATORY THERAPY | Age: 82
Discharge: HOME | End: 2017-12-11
Attending: INTERNAL MEDICINE
Payer: MEDICARE

## 2017-12-11 DIAGNOSIS — I48.0 PAROXYSMAL ATRIAL FIBRILLATION (HCC): ICD-10-CM

## 2017-12-11 DIAGNOSIS — Z79.899 ON AMIODARONE THERAPY: ICD-10-CM

## 2017-12-11 PROCEDURE — 94727 GAS DIL/WSHOT DETER LNG VOL: CPT

## 2017-12-11 PROCEDURE — 94010 BREATHING CAPACITY TEST: CPT

## 2017-12-11 PROCEDURE — 94729 DIFFUSING CAPACITY: CPT

## 2017-12-14 DIAGNOSIS — E78.00 PURE HYPERCHOLESTEROLEMIA: ICD-10-CM

## 2017-12-14 RX ORDER — PRAVASTATIN SODIUM 40 MG/1
TABLET ORAL
Qty: 90 TAB | Refills: 0 | Status: SHIPPED | OUTPATIENT
Start: 2017-12-14

## 2018-02-27 ENCOUNTER — TELEPHONE (OUTPATIENT)
Dept: CARDIOLOGY CLINIC | Age: 83
End: 2018-02-27

## 2018-02-27 NOTE — TELEPHONE ENCOUNTER
Cleared from cardiac view with the understanding that patient will have mild risk for this surgery.   Hold eliquis x 2days  Restart when ok with surgery post op

## 2018-02-27 NOTE — TELEPHONE ENCOUNTER
This has been fully explained to the patient, who indicates understanding. Faxed to Dr. Laurence price

## 2018-02-27 NOTE — TELEPHONE ENCOUNTER
Dr. Neil Powers needs clearance for dental extractions also advise on eliquis.  Office  PH# [phone]/ Daughter Mikaela Juan Miguel [phone]

## 2018-03-17 DIAGNOSIS — E78.00 PURE HYPERCHOLESTEROLEMIA: ICD-10-CM

## 2018-03-19 RX ORDER — EZETIMIBE 10 MG/1
TABLET ORAL
Qty: 90 TAB | Refills: 0 | Status: SHIPPED | OUTPATIENT
Start: 2018-03-19

## 2018-05-21 ENCOUNTER — TELEPHONE (OUTPATIENT)
Dept: CARDIOLOGY CLINIC | Age: 83
End: 2018-05-21

## 2018-06-15 ENCOUNTER — CLINICAL SUPPORT (OUTPATIENT)
Dept: CARDIOLOGY CLINIC | Age: 83
End: 2018-06-15

## 2018-06-15 VITALS
DIASTOLIC BLOOD PRESSURE: 51 MMHG | HEIGHT: 60 IN | WEIGHT: 181 LBS | HEART RATE: 65 BPM | BODY MASS INDEX: 35.53 KG/M2 | SYSTOLIC BLOOD PRESSURE: 121 MMHG

## 2018-06-15 DIAGNOSIS — I48.0 PAROXYSMAL ATRIAL FIBRILLATION (HCC): ICD-10-CM

## 2018-06-15 DIAGNOSIS — E66.01 MORBID OBESITY (HCC): ICD-10-CM

## 2018-06-15 DIAGNOSIS — I50.32 CHRONIC DIASTOLIC HEART FAILURE (HCC): Primary | ICD-10-CM

## 2018-06-15 DIAGNOSIS — Z79.899 ON AMIODARONE THERAPY: ICD-10-CM

## 2018-06-15 DIAGNOSIS — I10 ESSENTIAL HYPERTENSION, BENIGN: ICD-10-CM

## 2018-06-15 DIAGNOSIS — Z95.0 CARDIAC PACEMAKER IN SITU: ICD-10-CM

## 2018-06-15 NOTE — PROGRESS NOTES
Last Office Visit date : 12/1/2017   Next Office visit date :   Last remote check date : 5/2018  scanned in our chart : yes    1. Have you been to the ER, urgent care clinic since your last visit? Hospitalized since your last visit? Yes When: 4/2018 [facility]: [facility] Reason for visit: back pain    2. Have you seen or consulted any other health care providers outside of the [address] [place] [address] since your last visit? Include any pap smears or colon screening. Yes Where: pcp     3. Since your last visit, have you had any of the following symptoms? shortness of breath. 4.  Have you had any blood work, X-rays or cardiac testing? [facility]         5. Where do you normally have your labs drawn? [facility]  6. Do you need any refills today?    no

## 2018-06-15 NOTE — LETTER
Sariah Klein 
1935 
 
6/15/2018 Dear Shay Boykin MD 
 
I had the pleasure of evaluating  Ms. Fahad Woods in office today. Below are the relevant portions of my assessment and plan of care. ICD-10-CM ICD-9-CM 1. Chronic diastolic heart failure (HCC) O49.48 714.63 METABOLIC PANEL, BASIC  
 6/88- lasix prn; 12/17; 6/17; 1/17 NYHA 2;   
2. Paroxysmal atrial fibrillation (HCC) I48.0 427.31   
 6/18 frequent AHR on pacer check;   
3. On amiodarone therapy Z79.899 V58.69 HEPATIC FUNCTION PANEL  
   TSH 3RD GENERATION  
  12/17 78%DLCO 4. Essential hypertension, benign I10 401.1 6/18 d/c norvasc; 12/17 no norvasc now;  
5. Morbid obesity ([facility] [address] E66.01 278.01   
6. Cardiac pacemaker in situ-DDD Z95.0 V45.01 PROGRAM EVAL (IN PERSON) IMPLANT DEVICE,PACEMAKER,MULT LEAD  
 6/18(frequent AHR) normal function Current Outpatient Prescriptions Medication Sig Dispense Refill  ezetimibe (ZETIA) 10 mg tablet TAKE 1 TABLET BY MOUTH DAILY 90 Tab 0  pravastatin (PRAVACHOL) 40 mg tablet TAKE 1 TABLET BY MOUTH EVERY NIGHT 90 Tab 0  
 apixaban (ELIQUIS) 5 mg tablet Take 1 Tab by mouth two (2) times a day. 60 Tab 6  
 amiodarone (CORDARONE) 200 mg tablet TAKE 1 TABLET BY MOUTH ONCE EVERY DAY 90 Tab 1  ZETIA 10 mg tablet TAKE 1 TABLET BY MOUTH DAILY 90 Tab 0  
 valsartan-hydroCHLOROthiazide (DIOVAN-HCT) 160-25 mg per tablet TAKE 1 TABLET BY MOUTH DAILY 90 Tab 0  
 mometasone (NASONEX) 50 mcg/actuation nasal spray 2 Sprays daily.  benzonatate (TESSALON PERLES) 100 mg capsule Take 100 mg by mouth three (3) times daily as needed for Cough.  cyclobenzaprine (FLEXERIL) 5 mg tablet Take 5 mg by mouth.  CALCIUM CARBONATE-VITAMIN D2 PO Take  by mouth.  polyethylene glycol (MIRALAX) 17 gram packet Take 17 g by mouth daily.  ranitidine (ZANTAC) 150 mg tablet Take 150 mg by mouth two (2) times a day. Orders Placed This Encounter  HEPATIC FUNCTION PANEL Standing Status:   Future Standing Expiration Date:   9/15/2018  TSH 3RD GENERATION Standing Status:   Future Standing Expiration Date:   9/15/2018  METABOLIC PANEL, BASIC Standing Status:   Future Standing Expiration Date:   9/15/2018  PROGRAM EVAL (IN PERSON) IMPLANT DEVICE,PACEMAKER,MULT LEAD Order Specific Question:   Reason for Exam: Answer:   f/u pacer If you have questions, please do not hesitate to call me. I look forward to following Ms. Gomez along with you. Sincerely, Jammie Mustafa MD

## 2018-06-15 NOTE — PATIENT INSTRUCTIONS
After the recommended changes have been made in blood pressure medicines, patient advised to keep BP/HR(pulse rate) chart twice daily and bring us results in next 2 weeks or so. Patient may send the results via \"My Chart\" if desired. Please rest for 5-10 minutes before checking blood pressure  Medications Discontinued During This Encounter   Medication Reason    acetaminophen-codeine (TYLENOL #3) 300-30 mg per tablet Therapy Completed    amLODIPine (NORVASC) 2.5 mg tablet Therapy Completed          Body Mass Index: Care Instructions  Your Care Instructions    Body mass index (BMI) can help you see if your weight is raising your risk for health problems. It uses a formula to compare how much you weigh with how tall you are. · A BMI lower than 18.5 is considered underweight. · A BMI between 18.5 and 24.9 is considered healthy. · A BMI between 25 and 29.9 is considered overweight. A BMI of 30 or higher is considered obese. If your BMI is in the normal range, it means that you have a lower risk for weight-related health problems. If your BMI is in the overweight or obese range, you may be at increased risk for weight-related health problems, such as high blood pressure, heart disease, stroke, arthritis or joint pain, and diabetes. If your BMI is in the underweight range, you may be at increased risk for health problems such as fatigue, lower protection (immunity) against illness, muscle loss, bone loss, hair loss, and hormone problems. BMI is just one measure of your risk for weight-related health problems. You may be at higher risk for health problems if you are not active, you eat an unhealthy diet, or you drink too much alcohol or use tobacco products. Follow-up care is a key part of your treatment and safety. Be sure to make and go to all appointments, and call your doctor if you are having problems. It's also a good idea to know your test results and keep a list of the medicines you take.   How can you care for yourself at home? · Practice healthy eating habits. This includes eating plenty of fruits, vegetables, whole grains, lean protein, and low-fat dairy. · If your doctor recommends it, get more exercise. Walking is a good choice. Bit by bit, increase the amount you walk every day. Try for at least 30 minutes on most days of the week. · Do not smoke. Smoking can increase your risk for health problems. If you need help quitting, talk to your doctor about stop-smoking programs and medicines. These can increase your chances of quitting for good. · Limit alcohol to 2 drinks a day for men and 1 drink a day for women. Too much alcohol can cause health problems. If you have a BMI higher than 25  · Your doctor may do other tests to check your risk for weight-related health problems. This may include measuring the distance around your waist. A waist measurement of more than 40 inches in men or 35 inches in women can increase the risk of weight-related health problems. · Talk with your doctor about steps you can take to stay healthy or improve your health. You may need to make lifestyle changes to lose weight and stay healthy, such as changing your diet and getting regular exercise. If you have a BMI lower than 18.5  · Your doctor may do other tests to check your risk for health problems. · Talk with your doctor about steps you can take to stay healthy or improve your health. You may need to make lifestyle changes to gain or maintain weight and stay healthy, such as getting more healthy foods in your diet and doing exercises to build muscle. Where can you learn more? Go to http://margarita-eddie.info/. Enter S176 in the search box to learn more about \"Body Mass Index: Care Instructions. \"  Current as of: October 13, 2016  Content Version: 11.4  © 3041-7121 [employer], [employer].  Care instructions adapted under license by [employer] (which disclaims liability or warranty for this information). If you have questions about a medical condition or this instruction, always ask your healthcare professional. [place] [address] any warranty or liability for your use of this information.

## 2018-06-15 NOTE — PROGRESS NOTES
HISTORY OF PRESENT ILLNESS  Margarita Doran is a 81 y.o. female. HPI Comments:  f/u of CHF, HTN, DDD, obesity  6/17 has cough x 3wks, improving slowly  12/15 diarrhoea and diverticulitis for 1-2 wks; also happened in 11/15    Palpitations    The history is provided by the medical records (h/o Parox AFib on pacer check). This is a recurrent problem. The current episode started more than 1 week ago. The problem occurs daily. The problem is associated with exercise (walking). Associated symptoms include lower extremity edema, dizziness and shortness of breath. Pertinent negatives include no fever, no malaise/fatigue, no chest pain, no claudication, no orthopnea, no PND, no nausea, no vomiting, no headaches and no cough. Her past medical history is significant for hypertension. CHF   The history is provided by the medical records. This is a chronic problem. The problem has not changed since onset. Associated symptoms include shortness of breath. Pertinent negatives include no chest pain and no headaches. Hypertension   The history is provided by the medical records. This is a chronic problem. Associated symptoms include shortness of breath. Pertinent negatives include no chest pain and no headaches. Cholesterol Problem   The history is provided by the medical records. This is a chronic problem. Associated symptoms include shortness of breath. Pertinent negatives include no chest pain and no headaches. Shortness of Breath   The history is provided by the patient. This is a recurrent problem. The problem occurs frequently. The current episode started more than 1 week ago. The problem has not changed since onset. Pertinent negatives include no fever, no headaches, no cough, no wheezing, no PND, no orthopnea, no chest pain, no vomiting, no rash, no leg swelling and no claudication.  The problem's precipitants include exercise (walking 500-600 steps; 8/16 1000 steps;1/17 700 steps; 12/17 3-4 mts; 6/18 1000 steps). Dizziness   The history is provided by the patient. This is a new problem. The current episode started more than 1 week ago (6/15). The problem occurs rarely (<1/month). The problem has not changed since onset. Associated symptoms include shortness of breath. Pertinent negatives include no chest pain and no headaches. Associated symptoms comments: No falls  . The symptoms are aggravated by standing. The symptoms are relieved by sleep. She has tried nothing for the symptoms. Review of Systems   Constitutional: Negative for chills, fever, malaise/fatigue and weight loss. HENT: Negative for nosebleeds. Eyes: Negative for discharge. Respiratory: Positive for shortness of breath. Negative for cough and wheezing. Cardiovascular: Positive for palpitations (PAF). Negative for chest pain, orthopnea, claudication, leg swelling and PND. Gastrointestinal: Negative for diarrhea, nausea and vomiting. Genitourinary: Negative for dysuria and hematuria. Musculoskeletal: Negative for joint pain. Skin: Negative for rash. Neurological: Positive for dizziness. Negative for seizures, loss of consciousness and headaches. Endo/Heme/Allergies: Negative for polydipsia. Does not bruise/bleed easily. Psychiatric/Behavioral: Negative for depression and substance abuse. The patient does not have insomnia. Allergies   Allergen Reactions    Penicillins Unknown (comments)     Pt states she's not allergic       Past Medical History:   Diagnosis Date    Atrial fibrillation ([address]     Balance problem     Chest pain, unspecified     Resolved.  Chronic diastolic heart failure (HCC)     Stable symptoms.     Congestive heart failure, unspecified     Diarrhea 12/15/2015    f/u PCP     Essential hypertension     High blood pressure     Low back pain     Morbid obesity ([address]     On amiodarone therapy 6/26/2015 12/14 SGOT45, SGPT41, TSH 2.7 3/10 84%DLCO     Other and unspecified hyperlipidemia     Pacemaker     Reflux     Trauma     Unspecified disorder of thyroid     Min hyperthyroidism. Family History   Problem Relation Age of Onset    Heart Attack Father 37    Sudden Death Father     Hypertension Mother     Heart Disease Neg Hx      No history of ischemic heart disease.  Stroke Neg Hx        Social History   Substance Use Topics    Smoking status: Never Smoker    Smokeless tobacco: Never Used    Alcohol use No        Current Outpatient Prescriptions   Medication Sig    ezetimibe (ZETIA) 10 mg tablet TAKE 1 TABLET BY MOUTH DAILY    amLODIPine (NORVASC) 2.5 mg tablet TAKE 1 TABLET BY MOUTH DAILY    pravastatin (PRAVACHOL) 40 mg tablet TAKE 1 TABLET BY MOUTH EVERY NIGHT    apixaban (ELIQUIS) 5 mg tablet Take 1 Tab by mouth two (2) times a day.  amiodarone (CORDARONE) 200 mg tablet TAKE 1 TABLET BY MOUTH ONCE EVERY DAY    ZETIA 10 mg tablet TAKE 1 TABLET BY MOUTH DAILY    valsartan-hydroCHLOROthiazide (DIOVAN-HCT) 160-25 mg per tablet TAKE 1 TABLET BY MOUTH DAILY    mometasone (NASONEX) 50 mcg/actuation nasal spray 2 Sprays daily.  benzonatate (TESSALON PERLES) 100 mg capsule Take 100 mg by mouth three (3) times daily as needed for Cough.  cyclobenzaprine (FLEXERIL) 5 mg tablet Take 5 mg by mouth.  CALCIUM CARBONATE-VITAMIN D2 PO Take  by mouth.  polyethylene glycol (MIRALAX) 17 gram packet Take 17 g by mouth daily.  ranitidine (ZANTAC) 150 mg tablet Take 150 mg by mouth two (2) times a day. No current facility-administered medications for this visit. Past Surgical History:   Procedure Laterality Date    HX PACEMAKER  2002    Demand cardiac pacemaker-Had RV lead perforating and had sternot[facility]. Diagnostic Studies:  CARDIOLOGY STUDIES 3/10/2015 9/17/2013 6/1/2012 5/1/2012 3/1/2012 2/1/2012 6/1/2011   EKG Result - - - V paced, ?? underlying atrial rhythm.  - - -   ICD / Pacemaker Check Result - - gen change office check NIKO - nl function - Myocardial Perfusion Scan Result - - - - - - -   Echocardiogram - Complete Result - - - - 65% EF, mild MR/AI, PAP 48. - -   PFT's with DLCO Result 84%DLCO 94%DLCO - - - - see report   24 hr Holter Monitor Result - - - - - - -   Some recent data might be hidden       Visit Vitals    /51    Pulse 65    Ht 5' (1.524 m)    Wt 181 lb (82.1 kg)    BMI 35.35 kg/m2       Ms. David Moses has a reminder for a \"due or due soon\" health maintenance. I have asked that she contact her primary care provider for follow-up on this health maintenance. Physical Exam   Constitutional: She is oriented to person, place, and time. She appears well-developed and well-nourished. No distress. HENT:   Head: Normocephalic and atraumatic. Mouth/Throat: Normal dentition. Eyes: Right eye exhibits no discharge. Left eye exhibits no discharge. No scleral icterus. Neck: Neck supple. No JVD present. Carotid bruit is not present. No thyromegaly present. Cardiovascular: Normal rate, regular rhythm, S1 normal, S2 normal, normal heart sounds and intact distal pulses. Exam reveals no gallop and no friction rub. No murmur heard. Pulmonary/Chest: Effort normal and breath sounds normal. She has no wheezes. She has no rales. Abdominal: Soft. She exhibits no mass. There is no tenderness. Musculoskeletal: She exhibits no edema. Lymphadenopathy:        Right cervical: No superficial cervical adenopathy present. Left cervical: No superficial cervical adenopathy present. Neurological: She is alert and oriented to person, place, and time. Skin: Skin is warm and dry. No rash noted. Psychiatric: She has a normal mood and affect. Her behavior is normal.       ASSESSMENT and PLAN      Discussed with patient the risk of bleeding on anticoagulation. Patient understands. Patient also understands the reduced risk of stroke with anticoagulation due to atrial fibrillation.     Appears confused regarding meds        Diagnoses and all orders for this visit:    1. Chronic diastolic [facility] ([facility]  Comments:  6/18- lasix prn; 12/17; 6/17; 1/17 NYHA 2;   Orders:  -     METABOLIC PANEL, BASIC; Future    2. Paroxysmal atrial fibrillation ([facility]  Comments:  6/18 frequent AHR on pacer check;     3. On amiodarone therapy  Comments:   12/17 78%DLCO    Orders:  -     HEPATIC FUNCTION PANEL; Future  -     TSH 3RD GENERATION; Future    4. Essential hypertension, benign  Comments:  6/18 d/c norvasc; 12/17 no norvasc now;    5. Morbid obesity ([facility]    6. Cardiac pacemaker in situ-DDD  Comments:  6/18(frequent AHR) normal function    Orders:  -     PROGRAM EVAL (IN PERSON) IMPLANT DEVICE,PACEMAKER,MULT LEAD        Pertinent laboratory and test data reviewed and discussed with patient.   See patient instructions also for other medical advice given    Medications Discontinued During This Encounter   Medication Reason    acetaminophen-codeine (TYLENOL #3) 300-30 mg per tablet Therapy Completed    amLODIPine (NORVASC) 2.5 mg tablet Therapy Completed       Follow-up Disposition:  Return in about 6 months (around 12/15/2018), or if symptoms worsen or fail to improve, for post test.

## 2018-06-15 NOTE — MR AVS SNAPSHOT
[address] [facility] [address] 
 
 
 [address] [facility] 
[phone] Patient: Esther Burgos MRN: [id number] :1935 Visit Information Date & Time Provider Department Dept. Phone Encounter #  
 6/15/2018 11:00 AM Juan David Voss MD [facility] [id number] [phone] Follow-up Instructions Return in about 6 months (around 12/15/2018), or if symptoms worsen or fail to improve, for post test.  
  
Your Appointments 2018  8:45 AM  
[facility] ([address] [place] [address] Road) Appt Note: [facility] [address] [facility] [address]  
  
   
 [address] [facility] [id number]  
  
    
 2018 11:00 AM  
Office Visit with Juan David Voss MD  
[facility] ([address] [place] [address]) Appt Note: 6 month follow up/LFT/TSH/BMP  
 [address] [facility] [address]  
  
   
 [address] 200 [facility] [address]  
  
    
 2018  8:45 AM  
[facility] ([address] [place] [address] Road) Appt Note: [facility] [address] [facility]  
[phone]  
  
    
 3/27/2019  8:30 AM  
[facility] ([address] [place] [address] Road) Appt Note: [facility] [address] [facility]  
[phone] Upcoming Health Maintenance Date Due DTaP/Tdap/Td series (1 - Tdap) 10/21/1957 ZOSTER VACCINE AGE 60> 1996 GLAUCOMA SCREENING Q2Y 10/21/2001 Bone Densitometry (Dexa) Screening 10/21/2001 Pneumococcal 65+ Low/Medium Risk (1 of 2 - PCV13) 10/21/2001 MEDICARE YEARLY EXAM 3/14/2018 Influenza Age 5 to Adult 2018 Allergies as of 6/15/2018  Review Complete On: 6/15/2018 By: Juan David Voss MD  
  
 Severity Noted Reaction Type Reactions Penicillins  03/18/2013    Unknown (comments) Pt states she's not allergic Current Immunizations  Never Reviewed No immunizations on file. Not reviewed this visit You Were Diagnosed With   
  
 Codes Comments Chronic diastolic heart failure ([facility]    -  Primary ICD-10-CM: I50.32 
ICD-9-CM: 428.32 6/18- lasix prn; 12/17; 6/17; 1/17 NYHA 2;   
 Paroxysmal atrial fibrillation (HCC)     ICD-10-CM: I48.0 ICD-9-CM: 427.31 6/18 frequent AHR on pacer check; On amiodarone therapy     ICD-10-CM: Z79.899 ICD-9-CM: V58.69  12/17 78%DLCO Essential hypertension, benign     ICD-10-CM: I10 
ICD-9-CM: 401.1 6/18 d/c norvasc; 12/17 no norvasc now; Morbid obesity ([facility]     ICD-10-CM: E66.01 
ICD-9-CM: 278.01 Cardiac pacemaker in situ     ICD-10-CM: Z95.0 ICD-9-CM: V45.01 6/18(frequent AHR) normal function Vitals BP Pulse Height(growth percentile) Weight(growth percentile) BMI OB Status 121/51 65 5' (1.524 m) 181 lb (82.1 kg) 35.35 kg/m2 Postmenopausal  
 Smoking Status Never Smoker Vitals History BMI and BSA Data Body Mass Index Body Surface Area  
 35.35 kg/m 2 1.86 m 2 Preferred Pharmacy Pharmacy Name Phone [address] [place] [address] [address] [facility] [phone] Your Updated Medication List  
  
   
This list is accurate as of 6/15/18 12:33 PM.  Always use your most recent med list.  
  
  
  
  
 amiodarone 200 mg tablet Commonly known as:  CORDARONE  
TAKE 1 TABLET BY MOUTH ONCE EVERY DAY  
  
 apixaban 5 mg tablet Commonly known as:  Roberto Carlos Corrente Take 1 Tab by mouth two (2) times a day. CALCIUM CARBONATE-VITAMIN D2 PO Take  by mouth. cyclobenzaprine 5 mg tablet Commonly known as:  FLEXERIL Take 5 mg by mouth. MIRALAX 17 gram packet Generic drug:  polyethylene glycol Take 17 g by mouth daily. mometasone 50 mcg/actuation nasal spray Commonly known as:  NASONEX  
2 Sprays daily. pravastatin 40 mg tablet Commonly known as:  PRAVACHOL  
TAKE 1 TABLET BY MOUTH EVERY NIGHT  
  
 raNITIdine 150 mg tablet Commonly known as:  ZANTAC Take 150 mg by mouth two (2) times a day. TESSALON PERLES 100 mg capsule Generic drug:  benzonatate Take 100 mg by mouth three (3) times daily as needed for Cough. valsartan-hydroCHLOROthiazide 160-25 mg per tablet Commonly known as:  DIOVAN-HCT  
TAKE 1 TABLET BY MOUTH DAILY * ZETIA 10 mg tablet Generic drug:  ezetimibe TAKE 1 TABLET BY MOUTH DAILY  
  
 * ezetimibe 10 mg tablet Commonly known as:  Ana Lilia Gobble TAKE 1 TABLET BY MOUTH DAILY * Notice: This list has 2 medication(s) that are the same as other medications prescribed for you. Read the directions carefully, and ask your doctor or other care provider to review them with you. We Performed the Following PROGRAM EVAL (IN PERSON) IMPLANT DEVICE,PACEMAKER,MULT LEAD [id number] CPT(R)] Follow-up Instructions Return in about 6 months (around 12/15/2018), or if symptoms worsen or fail to improve, for post test.  
  
To-Do List   
 Around 06/15/2018 Lab:  HEPATIC FUNCTION PANEL Around 06/15/2018 Lab:  METABOLIC PANEL, BASIC Around 06/15/2018 Lab:  TSH 3RD GENERATION Patient Instructions After the recommended changes have been made in blood pressure medicines, patient advised to keep BP/HR(pulse rate) chart twice daily and bring us results in next 2 weeks or so. Patient may send the results via \"My Chart\" if desired. Please rest for 5-10 minutes before checking blood pressure Medications Discontinued During This Encounter Medication Reason  acetaminophen-codeine (TYLENOL #3) 300-30 mg per tablet Therapy Completed  amLODIPine (NORVASC) 2.5 mg tablet Therapy Completed Body Mass Index: Care Instructions Your Care Instructions Body mass index (BMI) can help you see if your weight is raising your risk for health problems. It uses a formula to compare how much you weigh with how tall you are. · A BMI lower than 18.5 is considered underweight. · A BMI between 18.5 and 24.9 is considered healthy. · A BMI between 25 and 29.9 is considered overweight. A BMI of 30 or higher is considered obese. If your BMI is in the normal range, it means that you have a lower risk for weight-related health problems. If your BMI is in the overweight or obese range, you may be at increased risk for weight-related health problems, such as high blood pressure, heart disease, stroke, arthritis or joint pain, and diabetes. If your BMI is in the underweight range, you may be at increased risk for health problems such as fatigue, lower protection (immunity) against illness, muscle loss, bone loss, hair loss, and hormone problems. BMI is just one measure of your risk for weight-related health problems. You may be at higher risk for health problems if you are not active, you eat an unhealthy diet, or you drink too much alcohol or use tobacco products. Follow-up care is a key part of your treatment and safety. Be sure to make and go to all appointments, and call your doctor if you are having problems. It's also a good idea to know your test results and keep a list of the medicines you take. How can you care for yourself at home? · Practice healthy eating habits. This includes eating plenty of fruits, vegetables, whole grains, lean protein, and low-fat dairy. · If your doctor recommends it, get more exercise. Walking is a good choice. Bit by bit, increase the amount you walk every day. Try for at least 30 minutes on most days of the week. · Do not smoke. Smoking can increase your risk for health problems. If you need help quitting, talk to your doctor about stop-smoking programs and medicines. These can increase your chances of quitting for good. · Limit alcohol to 2 drinks a day for men and 1 drink a day for women. Too much alcohol can cause health problems. If you have a BMI higher than 25 · Your doctor may do other tests to check your risk for weight-related health problems. This may include measuring the distance around your waist. A waist measurement of more than 40 inches in men or 35 inches in women can increase the risk of weight-related health problems. · Talk with your doctor about steps you can take to stay healthy or improve your health. You may need to make lifestyle changes to lose weight and stay healthy, such as changing your diet and getting regular exercise. If you have a BMI lower than 18.5 · Your doctor may do other tests to check your risk for health problems. · Talk with your doctor about steps you can take to stay healthy or improve your health. You may need to make lifestyle changes to gain or maintain weight and stay healthy, such as getting more healthy foods in your diet and doing exercises to build muscle. Where can you learn more? Go to http://margarita-eddie.info/. Enter S176 in the search box to learn more about \"Body Mass Index: Care Instructions. \" Current as of: October 13, 2016 Content Version: 11.4 © 2980-6929 [employer]. Care instructions adapted under license by [employer] (which disclaims liability or warranty for this information). If you have questions about a medical condition or this instruction, always ask your healthcare professional. [place] [address] any warranty or liability for your use of this information. Please provide this summary of care documentation to your next provider. Your primary care clinician is listed as [employer]. If you have any questions after today's visit, please call [phone].

## 2018-06-24 DIAGNOSIS — I48.0 PAROXYSMAL ATRIAL FIBRILLATION (HCC): ICD-10-CM

## 2018-06-25 RX ORDER — AMIODARONE HYDROCHLORIDE 200 MG/1
TABLET ORAL
Qty: 90 TAB | Refills: 0 | Status: SHIPPED | OUTPATIENT
Start: 2018-06-25 | End: 2018-12-28 | Stop reason: SDUPTHER

## 2018-10-01 ENCOUNTER — TELEPHONE (OUTPATIENT)
Dept: CARDIOLOGY CLINIC | Age: 83
End: 2018-10-01

## 2018-10-01 DIAGNOSIS — I50.32 CHRONIC DIASTOLIC HEART FAILURE (HCC): Primary | ICD-10-CM

## 2018-10-01 DIAGNOSIS — I50.33 DIASTOLIC CHF, ACUTE ON CHRONIC (HCC): Primary | ICD-10-CM

## 2018-10-01 RX ORDER — POTASSIUM CHLORIDE 750 MG/1
10 TABLET, EXTENDED RELEASE ORAL DAILY
Qty: 30 TAB | Refills: 3 | Status: SHIPPED | OUTPATIENT
Start: 2018-10-01 | End: 2018-10-02 | Stop reason: DRUGHIGH

## 2018-10-01 RX ORDER — VALSARTAN 160 MG/1
160 TABLET ORAL DAILY
Qty: 30 TAB | Refills: 5 | Status: SHIPPED | OUTPATIENT
Start: 2018-10-01 | End: 2018-10-01 | Stop reason: SDUPTHER

## 2018-10-01 RX ORDER — FUROSEMIDE 40 MG/1
40 TABLET ORAL DAILY
Qty: 30 TAB | Refills: 3 | Status: SHIPPED | OUTPATIENT
Start: 2018-10-01 | End: 2018-10-01 | Stop reason: SDUPTHER

## 2018-10-01 RX ORDER — POTASSIUM CHLORIDE 20 MEQ/1
20 TABLET, EXTENDED RELEASE ORAL 2 TIMES DAILY
Qty: 30 TAB | Refills: 3 | Status: SHIPPED | OUTPATIENT
Start: 2018-10-01 | End: 2018-10-01 | Stop reason: ALTCHOICE

## 2018-10-01 NOTE — TELEPHONE ENCOUNTER
Called and left message for patient to start valsartan 160 mg/day and  lasix 40 mg a day and potassium 10 meq a day and repeat BMP in 1 week Per paper message NP Timoteo. Spoke with son and he will give instructions to patient and wants lab mailed to patient. She voices understanding and acceptance of this advice and will call back if any further questions or concerns.

## 2018-10-02 ENCOUNTER — TELEPHONE (OUTPATIENT)
Dept: CARDIOLOGY CLINIC | Age: 83
End: 2018-10-02

## 2018-10-02 DIAGNOSIS — I50.32 CHRONIC DIASTOLIC HEART FAILURE (HCC): Primary | ICD-10-CM

## 2018-10-02 DIAGNOSIS — I50.32 CHRONIC DIASTOLIC HEART FAILURE (HCC): ICD-10-CM

## 2018-10-02 RX ORDER — POTASSIUM CHLORIDE 20 MEQ/1
TABLET, EXTENDED RELEASE ORAL
Qty: 180 TAB | Refills: 3 | Status: SHIPPED | OUTPATIENT
Start: 2018-10-02 | End: 2018-10-02 | Stop reason: DRUGHIGH

## 2018-10-02 RX ORDER — POTASSIUM CHLORIDE 750 MG/1
TABLET, EXTENDED RELEASE ORAL
Qty: 90 TAB | Refills: 3 | Status: SHIPPED | OUTPATIENT
Start: 2018-10-02 | End: 2019-02-14 | Stop reason: SDUPTHER

## 2018-10-02 RX ORDER — FUROSEMIDE 40 MG/1
TABLET ORAL
Qty: 90 TAB | Refills: 3 | Status: SHIPPED | OUTPATIENT
Start: 2018-10-02 | End: 2019-01-01

## 2018-10-02 RX ORDER — VALSARTAN 160 MG/1
TABLET ORAL
Qty: 90 TAB | Refills: 5 | Status: SHIPPED | OUTPATIENT
Start: 2018-10-02 | End: 2018-11-26 | Stop reason: DRUGHIGH

## 2018-10-02 NOTE — TELEPHONE ENCOUNTER
----- Message from Amber Funk NP sent at 10/2/2018  8:12 AM EDT -----  Please can you order LFT's in 1 week her liver enzymes are mild elevated.   Thanks

## 2018-11-26 ENCOUNTER — TELEPHONE (OUTPATIENT)
Dept: CARDIOLOGY CLINIC | Age: 83
End: 2018-11-26

## 2018-11-26 DIAGNOSIS — I10 ESSENTIAL HYPERTENSION, BENIGN: Primary | ICD-10-CM

## 2018-11-26 RX ORDER — VALSARTAN 80 MG/1
80 TABLET ORAL DAILY
Qty: 30 TAB | Refills: 1 | Status: SHIPPED | OUTPATIENT
Start: 2018-11-26 | End: 2018-11-30 | Stop reason: SDUPTHER

## 2018-11-26 NOTE — TELEPHONE ENCOUNTER
BP: 167/84  HR: 92    Patient is not currently taking any bp meds. States at 9:30 this a.m blood pressure was 180/91, 64 pulse.

## 2018-11-26 NOTE — TELEPHONE ENCOUNTER
Start valsartan 80 mg a day. I sent a prescription to pharmacy. BMP in 1 week I signed the labs. BP/HR chart twice a day for next 5 days after starting the medicine.

## 2018-11-26 NOTE — TELEPHONE ENCOUNTER
Spoke with the patient son, voiced understanding.  Will call the office with any questions or concerns

## 2018-11-30 ENCOUNTER — OFFICE VISIT (OUTPATIENT)
Dept: CARDIOLOGY CLINIC | Age: 83
End: 2018-11-30

## 2018-11-30 VITALS
SYSTOLIC BLOOD PRESSURE: 168 MMHG | HEIGHT: 60 IN | DIASTOLIC BLOOD PRESSURE: 66 MMHG | WEIGHT: 176 LBS | HEART RATE: 69 BPM | BODY MASS INDEX: 34.55 KG/M2

## 2018-11-30 DIAGNOSIS — I10 ESSENTIAL HYPERTENSION, BENIGN: ICD-10-CM

## 2018-11-30 DIAGNOSIS — E66.01 MORBID OBESITY (HCC): ICD-10-CM

## 2018-11-30 DIAGNOSIS — I48.0 PAROXYSMAL ATRIAL FIBRILLATION (HCC): ICD-10-CM

## 2018-11-30 DIAGNOSIS — I50.32 CHRONIC DIASTOLIC HEART FAILURE (HCC): Primary | ICD-10-CM

## 2018-11-30 DIAGNOSIS — Z95.0 CARDIAC PACEMAKER IN SITU: ICD-10-CM

## 2018-11-30 DIAGNOSIS — Z92.29 HISTORY OF AMIODARONE THERAPY: ICD-10-CM

## 2018-11-30 RX ORDER — AMLODIPINE BESYLATE 2.5 MG/1
2.5 TABLET ORAL DAILY
Qty: 30 TAB | Refills: 1 | Status: SHIPPED | OUTPATIENT
Start: 2018-11-30 | End: 2018-11-30 | Stop reason: SDUPTHER

## 2018-11-30 RX ORDER — AMLODIPINE BESYLATE 2.5 MG/1
TABLET ORAL
Qty: 90 TAB | Refills: 1 | Status: SHIPPED | OUTPATIENT
Start: 2018-11-30 | End: 2019-01-01 | Stop reason: SDUPTHER

## 2018-11-30 RX ORDER — VALSARTAN AND HYDROCHLOROTHIAZIDE 160; 25 MG/1; MG/1
1 TABLET ORAL DAILY
COMMUNITY
End: 2019-01-01 | Stop reason: SDUPTHER

## 2018-11-30 NOTE — PROGRESS NOTES
1. Have you been to the ER, urgent care clinic since your last visit? Hospitalized since your last visit? Yes Where: HV/back pain 2. Have you seen or consulted any other health care providers outside of the [address] [place] [address] since your last visit? Include any pap smears or colon screening. Yes Where: GI  
 
3. Since your last visit, have you had any of the following symptoms? .  
 
   
 
4. Have you had any blood work, X-rays or cardiac testing? Yes Where:  Reason for visit: labs 5. Where do you normally have your labs drawn? HV 6. Do you need any refills today?    no

## 2018-11-30 NOTE — PATIENT INSTRUCTIONS
Medications Discontinued During This Encounter Medication Reason  ELIQUIS 5 mg tablet Duplicate Order  valsartan (DIOVAN) 80 mg tablet Duplicate Order After the recommended changes have been made in blood pressure medicines, patient advised to keep BP/HR(pulse rate) chart twice daily and bring us results in next 2 weeks or so. Patient may send the results via \"My Chart\" if desired. Please rest for 5-10 minutes before checking blood pressure Learning About Diuretics for High Blood Pressure Introduction Diuretics help to lower blood pressure. This reduces your risk of a heart attack and stroke. It also reduces your risk of kidney disease. Diuretics cause your kidneys to remove sodium and water. They also relax the blood vessel walls. These help lower your blood pressure. Examples · Chlorthalidone · Hydrochlorothiazide Possible side effects There are some common side effects. They are: · Too little potassium. · Feeling dizzy. · Rash. · Urinating a lot. · High blood sugar. (But this is not common.) You may have other side effects. Check the information that comes with your medicine. What to know about taking this medicine · You may take other medicines for blood pressure. Diuretics can help those work better. They can also prevent extra fluid in your body. · You may need to take potassium pills. Or you may have to watch how much potassium is in your food. Ask your doctor about this. · You may need blood tests to check your kidneys and your potassium level. · Take your medicines exactly as prescribed. Call your doctor if you think you are having a problem with your medicine. · Check with your doctor or pharmacist before you use any other medicines. This includes over-the-counter medicines. Make sure your doctor knows all of the medicines, vitamins, herbal products, and supplements you take. Taking some medicines together can cause problems. Where can you learn more? Go to http://margarita-eddie.info/. Enter K272 in the search box to learn more about \"Learning About Diuretics for High Blood Pressure. \" Current as of: December 6, 2017 Content Version: 11.8 © 5928-8998 [employer], [employer]. Care instructions adapted under license by [employer] (which disclaims liability or warranty for this information). If you have questions about a medical condition or this instruction, always ask your healthcare professional. [address] any warranty or liability for your use of this information.

## 2018-11-30 NOTE — PROGRESS NOTES
HISTORY OF PRESENT ILLNESS Enio Forward is a 81 y.o. female. f/u of CHF, HTN, DDD, obesity 11/18 has left lower back and left infra axillary pain for last 2-3 weeks and has been advised gallbladder surgery. 6/17 has cough x 3wks, improving slowly 12/15 diarrhoea and diverticulitis for 1-2 wks; also happened in 11/15 Palpitations The history is provided by the medical records (h/o Parox AFib on pacer check). This is a recurrent problem. The current episode started more than 1 week ago. The problem occurs daily. The problem is associated with exercise (walking). Associated symptoms include lower extremity edema, dizziness and shortness of breath. Pertinent negatives include no fever, no malaise/fatigue, no chest pain, no claudication, no orthopnea, no PND, no nausea, no vomiting, no headaches and no cough. Her past medical history is significant for hypertension. CHF The history is provided by the medical records. This is a chronic problem. The problem has not changed since onset. Associated symptoms include shortness of breath. Pertinent negatives include no chest pain and no headaches. Hypertension The history is provided by the medical records. This is a chronic problem. Associated symptoms include shortness of breath. Pertinent negatives include no chest pain and no headaches. Cholesterol Problem The history is provided by the medical records. This is a chronic problem. Associated symptoms include shortness of breath. Pertinent negatives include no chest pain and no headaches. Shortness of Breath The history is provided by the patient. This is a recurrent problem. The problem occurs frequently. The current episode started more than 1 week ago. The problem has not changed since onset. Pertinent negatives include no fever, no headaches, no cough, no wheezing, no PND, no orthopnea, no chest pain, no vomiting, no rash, no leg swelling and no claudication.  The problem's precipitants include exercise (walking 500-600 steps; 8/16 1000 steps;1/17 700 steps; 12/17 3-4 mts; 6/18 1000 steps). Dizziness The history is provided by the patient. This is a new problem. The current episode started more than 1 week ago (6/15). The problem occurs rarely (<1/month). The problem has been resolved. Associated symptoms include shortness of breath. Pertinent negatives include no chest pain and no headaches. Associated symptoms comments: No falls [place] The symptoms are aggravated by standing. The symptoms are relieved by sleep. She has tried nothing for the symptoms. Review of Systems Constitutional: Negative for chills, fever, malaise/fatigue and weight loss. HENT: Negative for nosebleeds. Eyes: Negative for discharge. Respiratory: Positive for shortness of breath. Negative for cough and wheezing. Cardiovascular: Positive for palpitations. Negative for chest pain, orthopnea, claudication, leg swelling and PND. Gastrointestinal: Negative for diarrhea, nausea and vomiting. Genitourinary: Negative for dysuria and hematuria. Musculoskeletal: Negative for joint pain. Skin: Negative for rash. Neurological: Positive for dizziness. Negative for seizures, loss of consciousness and headaches. Endo/Heme/Allergies: Negative for polydipsia. Does not bruise/bleed easily. Psychiatric/Behavioral: Negative for depression and substance abuse. The patient does not have insomnia. Allergies Allergen Reactions  Penicillins Unknown (comments) Pt states she's not allergic Past Medical History:  
Diagnosis Date  Atrial fibrillation ([address]  Balance problem  Chest pain, unspecified Resolved.  Chronic diastolic heart failure ([address] Stable symptoms.  Congestive heart failure, unspecified  Diarrhea 12/15/2015  
 f/u PCP  Essential hypertension  High blood pressure  Low back pain  Morbid obesity ([address]  On amiodarone therapy 6/26/2015 12/14 SGOT45, SGPT41, TSH 2.7 3/10 84%DLCO   
 Other and unspecified hyperlipidemia  Pacemaker  Reflux  Trauma  Unspecified disorder of thyroid Min hyperthyroidism. Family History Problem Relation Age of Onset  Heart Attack Father 39  Sudden Death Father  Hypertension Mother  Heart Disease Neg Hx No history of ischemic heart disease.  Stroke Neg Hx Social History Tobacco Use  Smoking status: Never Smoker  Smokeless tobacco: Never Used Substance Use Topics  Alcohol use: No  
  Alcohol/week: 0.0 oz  Drug use: No  
  
 
Current Outpatient Medications Medication Sig  
 valsartan-hydroCHLOROthiazide (DIOVAN-HCT) 160-25 mg per tablet Take 1 Tab by mouth daily.  furosemide (LASIX) 40 mg tablet TAKE 1 TABLET BY MOUTH DAILY  potassium chloride (KLOR-CON) 10 mEq tablet TAKE 1 TABLET BY MOUTH DAILY  amiodarone (CORDARONE) 200 mg tablet TAKE 1 TABLET BY MOUTH EVERY DAY  ezetimibe (ZETIA) 10 mg tablet TAKE 1 TABLET BY MOUTH DAILY  pravastatin (PRAVACHOL) 40 mg tablet TAKE 1 TABLET BY MOUTH EVERY NIGHT  apixaban (ELIQUIS) 5 mg tablet Take 1 Tab by mouth two (2) times a day.  ZETIA 10 mg tablet TAKE 1 TABLET BY MOUTH DAILY  mometasone (NASONEX) 50 mcg/actuation nasal spray 2 Sprays daily.  benzonatate (TESSALON PERLES) 100 mg capsule Take 100 mg by mouth three (3) times daily as needed for Cough.  cyclobenzaprine (FLEXERIL) 5 mg tablet Take 5 mg by mouth.  CALCIUM CARBONATE-VITAMIN D2 PO Take  by mouth.  polyethylene glycol (MIRALAX) 17 gram packet Take 17 g by mouth daily.  ranitidine (ZANTAC) 150 mg tablet Take 150 mg by mouth two (2) times a day. No current facility-administered medications for this visit. Past Surgical History:  
Procedure Laterality Date  HX PACEMAKER  2002 [employer] cardiac pacemaker-Had RV lead perforating and had sternotomy at [facility]. Diagnostic Studies: 
CARDIOLOGY STUDIES 3/10/2015 9/17/2013 PFT's with DLCO Result 84%DLCO 94%DLCO Some recent data might be hidden Visit Vitals /66 Pulse 69 Ht 5' (1.524 m) Wt 79.8 kg (176 lb) BMI 34.37 kg/m² Ms. Salma Saunders has a reminder for a \"due or due soon\" health maintenance. I have asked that she contact her primary care provider for follow-up on this health maintenance. Physical Exam  
Constitutional: She is oriented to person, place, and time. She appears well-developed and well-nourished. No distress. HENT:  
Head: Normocephalic and atraumatic. Mouth/Throat: Normal dentition. Eyes: Right eye exhibits no discharge. Left eye exhibits no discharge. No scleral icterus. Neck: Neck supple. No JVD present. Carotid bruit is not present. No thyromegaly present. Cardiovascular: Normal rate, regular rhythm, S1 normal, S2 normal, normal heart sounds and intact distal pulses. Exam reveals no gallop and no friction rub. No murmur heard. Pulmonary/Chest: Effort normal and breath sounds normal. She has no wheezes. She has no rales. Abdominal: Soft. She exhibits no mass. There is no tenderness. Musculoskeletal: She exhibits edema (1+). Lymphadenopathy:  
     Right cervical: No superficial cervical adenopathy present. Left cervical: No superficial cervical adenopathy present. Neurological: She is alert and oriented to person, place, and time. Skin: Skin is warm and dry. No rash noted. Psychiatric: She has a normal mood and affect. Her behavior is normal.  
 
 
ASSESSMENT and PLAN 
 
HLD: LIPID COMPLETE PANEL2/13/2018 [address] Component Name Value Ref Range Cholesterol 128 110 - 200 mg/dL Triglyceride 75 40 - 149 mg/dL HDL 44 40 - 59 mg/dL Cholesterol/HDL 2.9 0.0 - 5.0 LDL CALCULATION 69 50 - 99 mg/dL VLDL CALCULATION 15 8 - 30 mg/dL History of amiodarone therapy: 12/17 78%DLCO 
  LFT : Normal 2/18 TSH: Normal 9/18 Pacemaker DDD:  
Office Checks:  6/18(frequent AHR) normal function Remote Checks : 
 
Back pain may be musculoskeletal or due to gallbladder. Discussed with patient and family. Final decision for surgery will be between the patient and surgeon. Routine LFTs due to amiodarone treatment. Restart low-dose Norvasc as the blood pressure is elevated and follow home chart. Diagnoses and all orders for this visit: 
 
1. Chronic diastolic heart failure ([address] 2. Essential hypertension, benign 
-     amLODIPine (NORVASC) 2.5 mg tablet; Take 1 Tab by mouth daily. 3. Morbid obesity ([address] 4. Cardiac pacemaker in situ-DDD 5. Paroxysmal atrial fibrillation (HCC) 
-     HEPATIC FUNCTION PANEL; Future 6. History of amiodarone therapy 
-     HEPATIC FUNCTION PANEL; Future Pertinent laboratory and test data reviewed and discussed with patient. See patient instructions also for other medical advice given Medications Discontinued During This Encounter Medication Reason  ELIQUIS 5 mg tablet Duplicate Order  valsartan (DIOVAN) 80 mg tablet Duplicate Order Follow-up Disposition: 
Return in about 6 months (around 5/30/2019), or if symptoms worsen or fail to improve, for post test.

## 2018-11-30 NOTE — LETTER
Fuentes Hearn 
1935 
 
11/30/2018 Dear MD Rosibel Sofia MD 
 
I had the pleasure of evaluating  Ms. Cecille Todd in office today. Below are the relevant portions of my assessment and plan of care. ICD-10-CM ICD-9-CM 1. Chronic diastolic heart failure (HCC) I50.32 428.32   
2. Essential hypertension, benign I10 401.1 amLODIPine (NORVASC) 2.5 mg tablet 3. Morbid obesity ([address] E66.01 278.01   
4. Cardiac pacemaker in situ-DDD Z95.0 V45.01   
5. Paroxysmal atrial fibrillation (HCC) I48.0 427.31 HEPATIC FUNCTION PANEL 6. History of amiodarone therapy Z92.29 V87.49 HEPATIC FUNCTION PANEL Current Outpatient Medications Medication Sig Dispense Refill  valsartan-hydroCHLOROthiazide (DIOVAN-HCT) 160-25 mg per tablet Take 1 Tab by mouth daily.  amLODIPine (NORVASC) 2.5 mg tablet Take 1 Tab by mouth daily. 30 Tab 1  
 furosemide (LASIX) 40 mg tablet TAKE 1 TABLET BY MOUTH DAILY 90 Tab 3  potassium chloride (KLOR-CON) 10 mEq tablet TAKE 1 TABLET BY MOUTH DAILY 90 Tab 3  
 amiodarone (CORDARONE) 200 mg tablet TAKE 1 TABLET BY MOUTH EVERY DAY 90 Tab 0  
 ezetimibe (ZETIA) 10 mg tablet TAKE 1 TABLET BY MOUTH DAILY 90 Tab 0  pravastatin (PRAVACHOL) 40 mg tablet TAKE 1 TABLET BY MOUTH EVERY NIGHT 90 Tab 0  
 apixaban (ELIQUIS) 5 mg tablet Take 1 Tab by mouth two (2) times a day. 60 Tab 6  ZETIA 10 mg tablet TAKE 1 TABLET BY MOUTH DAILY 90 Tab 0  
 mometasone (NASONEX) 50 mcg/actuation nasal spray 2 Sprays daily.  benzonatate (TESSALON PERLES) 100 mg capsule Take 100 mg by mouth three (3) times daily as needed for Cough.  cyclobenzaprine (FLEXERIL) 5 mg tablet Take 5 mg by mouth.  CALCIUM CARBONATE-VITAMIN D2 PO Take  by mouth.  polyethylene glycol (MIRALAX) 17 gram packet Take 17 g by mouth daily.  ranitidine (ZANTAC) 150 mg tablet Take 150 mg by mouth two (2) times a day. Orders Placed This Encounter  HEPATIC FUNCTION PANEL Standing Status:   Future Standing Expiration Date:   2/28/2019  
 valsartan-hydroCHLOROthiazide (DIOVAN-HCT) 160-25 mg per tablet Sig: Take 1 Tab by mouth daily.  amLODIPine (NORVASC) 2.5 mg tablet Sig: Take 1 Tab by mouth daily. Dispense:  30 Tab Refill:  1 If you have questions, please do not hesitate to call me. I look forward to following Ms. Gomez along with you. Sincerely, Jil Logan MD

## 2018-12-06 ENCOUNTER — HOSPITAL ENCOUNTER (OUTPATIENT)
Dept: LAB | Age: 83
Discharge: HOME | End: 2018-12-06
Payer: MEDICARE

## 2018-12-06 DIAGNOSIS — I48.0 PAROXYSMAL ATRIAL FIBRILLATION (HCC): ICD-10-CM

## 2018-12-06 DIAGNOSIS — I10 ESSENTIAL HYPERTENSION, BENIGN: ICD-10-CM

## 2018-12-06 DIAGNOSIS — Z92.29 HISTORY OF AMIODARONE THERAPY: ICD-10-CM

## 2018-12-06 LAB
ALBUMIN SERPL-MCNC: 2.8 G/DL (ref 3.4–5)
ALBUMIN/GLOB SERPL: 0.7 {RATIO} (ref 0.8–1.7)
ALP SERPL-CCNC: 105 U/L (ref 45–117)
ALT SERPL-CCNC: 23 U/L (ref 13–56)
ANION GAP SERPL CALC-SCNC: 5 MMOL/L (ref 3–18)
AST SERPL-CCNC: 30 U/L (ref 15–37)
BILIRUB DIRECT SERPL-MCNC: 0.2 MG/DL (ref 0–0.2)
BILIRUB SERPL-MCNC: 0.4 MG/DL (ref 0.2–1)
BUN SERPL-MCNC: 16 MG/DL (ref 7–18)
BUN/CREAT SERPL: 16 (ref 12–20)
CALCIUM SERPL-MCNC: 8.1 MG/DL (ref 8.5–10.1)
CHLORIDE SERPL-SCNC: 105 MMOL/L (ref 100–108)
CO2 SERPL-SCNC: 28 MMOL/L (ref 21–32)
CREAT SERPL-MCNC: 0.98 MG/DL (ref 0.6–1.3)
GLOBULIN SER CALC-MCNC: 4.1 G/DL (ref 2–4)
GLUCOSE SERPL-MCNC: 81 MG/DL (ref 74–99)
POTASSIUM SERPL-SCNC: 3.7 MMOL/L (ref 3.5–5.5)
PROT SERPL-MCNC: 6.9 G/DL (ref 6.4–8.2)
SODIUM SERPL-SCNC: 138 MMOL/L (ref 136–145)

## 2018-12-06 PROCEDURE — 36415 COLL VENOUS BLD VENIPUNCTURE: CPT

## 2018-12-06 PROCEDURE — 80076 HEPATIC FUNCTION PANEL: CPT

## 2018-12-06 PROCEDURE — 80048 BASIC METABOLIC PNL TOTAL CA: CPT

## 2018-12-28 DIAGNOSIS — I48.0 PAROXYSMAL ATRIAL FIBRILLATION (HCC): ICD-10-CM

## 2018-12-28 RX ORDER — AMIODARONE HYDROCHLORIDE 200 MG/1
TABLET ORAL
Qty: 90 TAB | Refills: 0 | Status: SHIPPED | OUTPATIENT
Start: 2018-12-28 | End: 2019-01-01 | Stop reason: SDUPTHER

## 2018-12-30 DIAGNOSIS — I48.0 PAROXYSMAL ATRIAL FIBRILLATION (HCC): ICD-10-CM

## 2018-12-31 RX ORDER — APIXABAN 5 MG/1
TABLET, FILM COATED ORAL
Qty: 60 TAB | Refills: 0 | Status: SHIPPED | OUTPATIENT
Start: 2018-12-31 | End: 2019-01-31 | Stop reason: SDUPTHER

## 2019-01-01 ENCOUNTER — HOSPITAL ENCOUNTER (OUTPATIENT)
Dept: PHYSICAL THERAPY | Age: 84
Discharge: HOME | End: 2019-11-21
Payer: MEDICARE

## 2019-01-01 ENCOUNTER — APPOINTMENT (OUTPATIENT)
Dept: PHYSICAL THERAPY | Age: 84
End: 2019-01-01
Payer: MEDICARE

## 2019-01-01 ENCOUNTER — HOSPITAL ENCOUNTER (OUTPATIENT)
Dept: PHYSICAL THERAPY | Age: 84
Discharge: HOME | End: 2019-11-25
Payer: MEDICARE

## 2019-01-01 ENCOUNTER — TELEPHONE (OUTPATIENT)
Dept: ORTHOPEDIC SURGERY | Age: 84
End: 2019-01-01

## 2019-01-01 ENCOUNTER — OFFICE VISIT (OUTPATIENT)
Dept: CARDIOLOGY CLINIC | Age: 84
End: 2019-01-01

## 2019-01-01 ENCOUNTER — HOSPITAL ENCOUNTER (OUTPATIENT)
Dept: PHYSICAL THERAPY | Age: 84
Discharge: HOME | End: 2019-11-18
Payer: MEDICARE

## 2019-01-01 ENCOUNTER — HOSPITAL ENCOUNTER (OUTPATIENT)
Dept: PHYSICAL THERAPY | Age: 84
Discharge: HOME | End: 2019-11-12
Payer: MEDICARE

## 2019-01-01 ENCOUNTER — HOSPITAL ENCOUNTER (OUTPATIENT)
Dept: PHYSICAL THERAPY | Age: 84
End: 2019-01-01
Payer: MEDICARE

## 2019-01-01 ENCOUNTER — HOSPITAL ENCOUNTER (OUTPATIENT)
Dept: PHYSICAL THERAPY | Age: 84
Discharge: HOME | End: 2019-12-04
Payer: MEDICARE

## 2019-01-01 ENCOUNTER — OFFICE VISIT (OUTPATIENT)
Dept: ORTHOPEDIC SURGERY | Age: 84
End: 2019-01-01

## 2019-01-01 ENCOUNTER — TELEPHONE (OUTPATIENT)
Dept: CARDIOLOGY CLINIC | Age: 84
End: 2019-01-01

## 2019-01-01 ENCOUNTER — HOSPITAL ENCOUNTER (OUTPATIENT)
Dept: PHYSICAL THERAPY | Age: 84
Discharge: HOME | End: 2019-12-02
Payer: MEDICARE

## 2019-01-01 VITALS
HEART RATE: 79 BPM | WEIGHT: 162 LBS | OXYGEN SATURATION: 97 % | TEMPERATURE: 98.1 F | BODY MASS INDEX: 31.8 KG/M2 | HEIGHT: 60 IN | RESPIRATION RATE: 16 BRPM | DIASTOLIC BLOOD PRESSURE: 84 MMHG | SYSTOLIC BLOOD PRESSURE: 168 MMHG

## 2019-01-01 VITALS
WEIGHT: 163 LBS | BODY MASS INDEX: 32 KG/M2 | DIASTOLIC BLOOD PRESSURE: 58 MMHG | HEART RATE: 68 BPM | HEIGHT: 60 IN | SYSTOLIC BLOOD PRESSURE: 132 MMHG

## 2019-01-01 DIAGNOSIS — E66.01 MORBID OBESITY (HCC): ICD-10-CM

## 2019-01-01 DIAGNOSIS — I48.0 PAROXYSMAL ATRIAL FIBRILLATION (HCC): ICD-10-CM

## 2019-01-01 DIAGNOSIS — Z95.0 CARDIAC PACEMAKER IN SITU: ICD-10-CM

## 2019-01-01 DIAGNOSIS — I10 ESSENTIAL HYPERTENSION, BENIGN: ICD-10-CM

## 2019-01-01 DIAGNOSIS — M54.50 THORACOLUMBAR BACK PAIN: ICD-10-CM

## 2019-01-01 DIAGNOSIS — E78.00 PURE HYPERCHOLESTEROLEMIA: ICD-10-CM

## 2019-01-01 DIAGNOSIS — I10 ESSENTIAL HYPERTENSION: ICD-10-CM

## 2019-01-01 DIAGNOSIS — I50.32 CHRONIC DIASTOLIC HEART FAILURE (HCC): Primary | ICD-10-CM

## 2019-01-01 DIAGNOSIS — I50.32 CHRONIC DIASTOLIC HEART FAILURE (HCC): ICD-10-CM

## 2019-01-01 DIAGNOSIS — M54.6 THORACOLUMBAR BACK PAIN: ICD-10-CM

## 2019-01-01 DIAGNOSIS — Z92.29 HISTORY OF AMIODARONE THERAPY: ICD-10-CM

## 2019-01-01 DIAGNOSIS — M54.2 NECK PAIN: Primary | ICD-10-CM

## 2019-01-01 PROCEDURE — 97110 THERAPEUTIC EXERCISES: CPT

## 2019-01-01 PROCEDURE — 97112 NEUROMUSCULAR REEDUCATION: CPT

## 2019-01-01 PROCEDURE — 97140 MANUAL THERAPY 1/> REGIONS: CPT

## 2019-01-01 PROCEDURE — 97530 THERAPEUTIC ACTIVITIES: CPT

## 2019-01-01 PROCEDURE — 97162 PT EVAL MOD COMPLEX 30 MIN: CPT

## 2019-01-01 RX ORDER — AMLODIPINE BESYLATE 2.5 MG/1
TABLET ORAL
Qty: 90 TAB | Refills: 0 | Status: SHIPPED | OUTPATIENT
Start: 2019-01-01 | End: 2020-01-01

## 2019-01-01 RX ORDER — AMIODARONE HYDROCHLORIDE 200 MG/1
TABLET ORAL
Qty: 90 TAB | Refills: 0 | Status: SHIPPED | OUTPATIENT
Start: 2019-01-01 | End: 2019-01-01 | Stop reason: SDUPTHER

## 2019-01-01 RX ORDER — LIDOCAINE 50 MG/G
1 PATCH TOPICAL EVERY 24 HOURS
Qty: 30 EACH | Refills: 1 | Status: SHIPPED | OUTPATIENT
Start: 2019-01-01

## 2019-01-01 RX ORDER — METHYLPREDNISOLONE 4 MG/1
TABLET ORAL
Qty: 1 DOSE PACK | Refills: 0 | Status: SHIPPED | OUTPATIENT
Start: 2019-01-01 | End: 2020-01-01 | Stop reason: ALTCHOICE

## 2019-01-01 RX ORDER — ACETAMINOPHEN AND CODEINE PHOSPHATE 300; 30 MG/1; MG/1
1 TABLET ORAL
COMMUNITY
End: 2020-01-01

## 2019-01-01 RX ORDER — POTASSIUM CHLORIDE 750 MG/1
TABLET, EXTENDED RELEASE ORAL
Qty: 90 TAB | Refills: 0 | Status: SHIPPED | OUTPATIENT
Start: 2019-01-01 | End: 2019-01-01 | Stop reason: SDUPTHER

## 2019-01-01 RX ORDER — VALSARTAN AND HYDROCHLOROTHIAZIDE 160; 25 MG/1; MG/1
TABLET ORAL
Qty: 90 TAB | Refills: 0 | Status: SHIPPED | OUTPATIENT
Start: 2019-01-01 | End: 2019-01-01

## 2019-01-01 RX ORDER — AMIODARONE HYDROCHLORIDE 200 MG/1
TABLET ORAL
Qty: 90 TAB | Refills: 0 | Status: SHIPPED | OUTPATIENT
Start: 2019-01-01 | End: 2020-01-01

## 2019-01-01 RX ORDER — DICYCLOMINE HYDROCHLORIDE 10 MG/1
10 CAPSULE ORAL
COMMUNITY

## 2019-01-01 RX ORDER — APIXABAN 5 MG/1
TABLET, FILM COATED ORAL
Qty: 60 TAB | Refills: 0 | Status: SHIPPED | OUTPATIENT
Start: 2019-01-01

## 2019-01-01 RX ORDER — METHOCARBAMOL 500 MG/1
TABLET, FILM COATED ORAL 4 TIMES DAILY
COMMUNITY

## 2019-01-01 RX ORDER — POTASSIUM CHLORIDE 750 MG/1
TABLET, EXTENDED RELEASE ORAL
Qty: 90 TAB | Refills: 0 | Status: SHIPPED | OUTPATIENT
Start: 2019-01-01

## 2019-01-01 RX ORDER — VALSARTAN AND HYDROCHLOROTHIAZIDE 160; 25 MG/1; MG/1
TABLET ORAL
Qty: 90 TAB | Refills: 0 | Status: SHIPPED | OUTPATIENT
Start: 2019-01-01 | End: 2020-01-01

## 2019-01-01 RX ORDER — VALSARTAN AND HYDROCHLOROTHIAZIDE 160; 25 MG/1; MG/1
TABLET ORAL
Qty: 90 TAB | Refills: 0 | Status: SHIPPED | OUTPATIENT
Start: 2019-01-01 | End: 2019-01-01 | Stop reason: SDUPTHER

## 2019-01-01 RX ORDER — AMLODIPINE BESYLATE 2.5 MG/1
TABLET ORAL
Qty: 90 TAB | Refills: 0 | Status: SHIPPED | OUTPATIENT
Start: 2019-01-01 | End: 2019-01-01 | Stop reason: SDUPTHER

## 2019-02-14 RX ORDER — POTASSIUM CHLORIDE 750 MG/1
TABLET, EXTENDED RELEASE ORAL
Qty: 30 TAB | Refills: 0 | Status: SHIPPED | OUTPATIENT
Start: 2019-02-14 | End: 2019-02-14 | Stop reason: SDUPTHER

## 2019-02-15 RX ORDER — POTASSIUM CHLORIDE 750 MG/1
TABLET, EXTENDED RELEASE ORAL
Qty: 90 TAB | Refills: 0 | Status: SHIPPED | OUTPATIENT
Start: 2019-02-15 | End: 2019-01-01 | Stop reason: SDUPTHER

## 2019-07-09 NOTE — TELEPHONE ENCOUNTER
Per MANAS Larkin's written order, BMP and magnesium ordered. Lab slip mailed to patient's address on file.

## 2019-09-20 NOTE — PROGRESS NOTES
1. Have you been to the ER, urgent care clinic since your last visit? Hospitalized since your last visit?     o  2. Have you seen or consulted any other health care providers outside of the [address] [place] [address] since your last visit? Include any pap smears or colon screening. Yes Where: pcp     3. Since your last visit, have you had any of the following symptoms? no        4. Have you had any blood work, X-rays or cardiac testing? Yes Where: [facility]          5. Where do you normally have your labs drawn?   [facility]   6. Do you need any refills today?    no

## 2019-09-20 NOTE — PATIENT INSTRUCTIONS
Medications Discontinued During This Encounter   Medication Reason    furosemide (LASIX) 40 mg tablet      please get remote checks for pacer or ICD every 3 months and Office check in 1 yr  Please call our office after every transmission to confirm success of transmission       A Healthy Heart: Care Instructions  Your Care Instructions    Heart disease occurs when a substance called plaque builds up in the vessels that supply oxygen-rich blood to your heart. This can narrow the blood vessels and reduce blood flow. A heart attack happens when blood flow is completely blocked. A high-fat diet, smoking, and other factors increase the risk of heart disease. Your doctor has found that you have a chance of having heart disease. You can do lots of things to keep your heart healthy. It may not be easy, but you can change your diet, exercise more, and quit smoking. These steps really work to lower your chance of heart disease. Follow-up care is a key part of your treatment and safety. Be sure to make and go to all appointments, and call your doctor if you are having problems. It's also a good idea to know your test results and keep a list of the medicines you take. How can you care for yourself at home? Diet    · Use less salt when you cook and eat. This helps lower your blood pressure. Taste food before salting. Add only a little salt when you think you need it. With time, your taste buds will adjust to less salt.     · Eat fewer snack items, fast foods, canned soups, and other high-salt, high-fat, processed foods.     · Read food labels and try to avoid saturated and trans fats. They increase your risk of heart disease by raising cholesterol levels.     · Limit the amount of solid fat-butter, margarine, and shortening-you eat. Use olive, peanut, or canola oil when you cook. Bake, broil, and steam foods instead of frying them.     · Eating fish can lower your risk for heart disease.  Eat at least 2 servings of fish a week. [place], mackerel, herring, sardines, and chunk light tuna are very good choices. These fish contain omega-3 fatty acids.     · Eat a variety of fruit and vegetables every day. Dark green, deep orange, red, or yellow fruits and vegetables are especially good for you. Examples include spinach, carrots, peaches, and berries.     · Foods high in fiber can reduce your cholesterol and provide important vitamins and minerals. High-fiber foods include whole-grain cereals and breads, oatmeal, beans, brown rice, citrus fruits, and apples.     · Limit drinks and foods with added sugar. These include candy, desserts, and soda pop.    Lifestyle changes    · If your doctor recommends it, get more exercise. Walking is a good choice. Bit by bit, increase the amount you walk every day. Try for at least 30 minutes on most days of the week. You also may want to swim, bike, or do other activities.     · Do not smoke. If you need help quitting, talk to your doctor about stop-smoking programs and medicines. These can increase your chances of quitting for good. Quitting smoking may be the most important step you can take to protect your heart. It is never too late to quit. You will get health benefits right away.     · Limit alcohol to 2 drinks a day for men and 1 drink a day for women. Too much alcohol can cause health problems. Medicines    · Take your medicines exactly as prescribed. Call your doctor if you think you are having a problem with your medicine.     · If your doctor recommends aspirin, take the amount directed each day. Make sure you take aspirin and not another kind of pain reliever, such as acetaminophen (Tylenol). If you take ibuprofen (such as Advil or Motrin) for other problems, take aspirin at least 2 hours before taking ibuprofen. When should you call for help? Call 911 if you have symptoms of a heart attack. These may include:    · Chest pain or pressure, or a strange feeling in the chest.     · Sweating.   · Shortness of breath.     · Pain, pressure, or a strange feeling in the back, neck, jaw, or upper belly or in one or both shoulders or arms.     · Lightheadedness or sudden weakness.     · A fast or irregular heartbeat.    After you call 911, the  may tell you to chew 1 adult-strength or 2 to 4 low-dose aspirin. Wait for an ambulance. Do not try to drive yourself.   Watch closely for changes in your health, and be sure to contact your doctor if you have any problems. Where can you learn more? Go to http://margarita-eddie.info/. Enter K368 in the search box to learn more about \"A Healthy Heart: Care Instructions. \"  Current as of: April 9, 2019  Content Version: 12.2  © 6281-8344 [employer]. Care instructions adapted under license by [employer] (which disclaims liability or warranty for this information). If you have questions about a medical condition or this instruction, always ask your healthcare professional. [address] any warranty or liability for your use of this information. [employer] Activation    Thank you for requesting access to [employer]. Please follow the instructions below to securely access and download your online medical record. [employer] allows you to send messages to your doctor, view your test results, renew your prescriptions, schedule appointments, and more. How Do I Sign Up? 1. In your internet browser, go to [email or web address]. 2. Click on the First Time User? Click Here link in the Sign In box. You will see the New Member Sign Up page. 3. Enter your [employer] Access Code exactly as it appears below. You will not need to use this code after youve completed the sign-up process. If you do not sign up before the expiration date, you must request a new code.     [employer] Access Code: [email or web address]  Expires: 11/4/2019 11:22 AM (This is the date your [employer] access code will )    4. Enter the last four digits of your Social Security Number (xxxx) and Date of Birth (mm/dd/yyyy) as indicated and click Submit. You will be taken to the next sign-up page. 5. Create a [employer] ID. This will be your [employer] login ID and cannot be changed, so think of one that is secure and easy to remember. 6. Create a [employer] password. You can change your password at any time. 7. Enter your Password Reset Question and Answer. This can be used at a later time if you forget your password. 8. Enter your e-mail address. You will receive e-mail notification when new information is available in [address] 9. Click Sign Up. You can now view and download portions of your medical record. 10. Click the Download Summary menu link to download a portable copy of your medical information. Additional Information    If you have questions, please visit the Frequently Asked Questions section of the [employer] website at [email or web address] Remember, [employer] is NOT to be used for urgent needs. For medical emergencies, dial 911.

## 2019-09-20 NOTE — PROGRESS NOTES
HISTORY OF PRESENT ILLNESS  Margarita Thakkar is a 81 y.o. female. f/u of CHF, HTN, DDD, obesity    11/18 has left lower back and left infra axillary pain for last 2-3 weeks and has been advised gallbladder surgery. 6/17 has cough x 3wks, improving slowly  12/15 diarrhoea and diverticulitis for 1-2 wks; also happened in 11/15    CHF   The history is provided by the medical records. This is a chronic problem. The problem has not changed since onset. Associated symptoms include shortness of breath. Pertinent negatives include no chest pain and no headaches. Palpitations    The history is provided by the medical records (h/o Parox AFib on pacer check). This is a recurrent problem. The current episode started more than 1 week ago. The problem occurs daily. The problem is associated with exercise (walking). Associated symptoms include lower extremity edema and shortness of breath. Pertinent negatives include no fever, no malaise/fatigue, no chest pain, no claudication, no orthopnea, no PND, no nausea, no vomiting, no headaches, no dizziness and no cough. Her past medical history is significant for hypertension. Hypertension   The history is provided by the medical records. This is a chronic problem. Associated symptoms include shortness of breath. Pertinent negatives include no chest pain and no headaches. Cholesterol Problem   The history is provided by the medical records. This is a chronic problem. Associated symptoms include shortness of breath. Pertinent negatives include no chest pain and no headaches. Shortness of Breath   The history is provided by the patient. This is a recurrent problem. The problem occurs frequently. The current episode started more than 1 week ago. The problem has been rapidly improving. Pertinent negatives include no fever, no headaches, no cough, no wheezing, no PND, no orthopnea, no chest pain, no vomiting, no rash, no leg swelling and no claudication.  The problem's precipitants include exercise (walking 500-600 steps; 8/16 1000 steps;1/17 700 steps; 12/17 3-4 mts; 6/18 1000 steps). Review of Systems   Constitutional: Negative for chills, fever, malaise/fatigue and weight loss. HENT: Negative for nosebleeds. Eyes: Negative for discharge. Respiratory: Positive for shortness of breath. Negative for cough and wheezing. Cardiovascular: Positive for palpitations (PAF). Negative for chest pain, orthopnea, claudication, leg swelling and PND. Gastrointestinal: Negative for diarrhea, nausea and vomiting. Genitourinary: Negative for dysuria and hematuria. Musculoskeletal: Negative for joint pain. Skin: Negative for rash. Neurological: Negative for dizziness, seizures, loss of consciousness and headaches. Endo/Heme/Allergies: Negative for polydipsia. Does not bruise/bleed easily. Psychiatric/Behavioral: Negative for depression and substance abuse. The patient does not have insomnia. Allergies   Allergen Reactions    Penicillins Unknown (comments)     Pt states she's not allergic       Past Medical History:   Diagnosis Date    Atrial fibrillation ([facility] [address]     Balance problem     Chest pain, unspecified     Resolved.  Chronic diastolic heart failure (HCC)     Stable symptoms.  Congestive heart failure, unspecified     Diarrhea 12/15/2015    f/u PCP     Essential hypertension     High blood pressure     Low back pain     Morbid obesity (HCC)     On amiodarone therapy 6/26/2015 12/14 SGOT45, SGPT41, TSH 2.7 3/10 84%DLCO     Other and unspecified hyperlipidemia     Pacemaker     Reflux     Trauma     Unspecified disorder of thyroid     Min hyperthyroidism. Family History   Problem Relation Age of Onset    Heart Attack Father 37    Sudden Death Father     Hypertension Mother     Heart Disease Neg Hx         No history of ischemic heart disease.     Stroke Neg Hx        Social History     Tobacco Use    Smoking status: Never Smoker    Smokeless tobacco: Never Used   Substance Use Topics    Alcohol use: No     Alcohol/week: 0.0 standard drinks    Drug use: No        Current Outpatient Medications   Medication Sig    amLODIPine (NORVASC) 2.5 mg tablet TAKE 1 TABLET BY MOUTH DAILY    ELIQUIS 5 mg tablet TAKE 1 TABLET BY MOUTH TWICE DAILY    valsartan-hydroCHLOROthiazide (DIOVAN-HCT) 160-25 mg per tablet TAKE 1 TABLET BY MOUTH EVERY DAY    amiodarone (CORDARONE) 200 mg tablet TAKE 1 TABLET BY MOUTH DAILY    potassium chloride (KLOR-CON) 10 mEq tablet TAKE 1 TABLET BY MOUTH DAILY    ezetimibe (ZETIA) 10 mg tablet TAKE 1 TABLET BY MOUTH DAILY    pravastatin (PRAVACHOL) 40 mg tablet TAKE 1 TABLET BY MOUTH EVERY NIGHT    ZETIA 10 mg tablet TAKE 1 TABLET BY MOUTH DAILY    mometasone (NASONEX) 50 mcg/actuation nasal spray 2 Sprays daily.  benzonatate (TESSALON PERLES) 100 mg capsule Take 100 mg by mouth three (3) times daily as needed for Cough.  cyclobenzaprine (FLEXERIL) 5 mg tablet Take 5 mg by mouth as needed.  CALCIUM CARBONATE-VITAMIN D2 PO Take  by mouth.  polyethylene glycol (MIRALAX) 17 gram packet Take 17 g by mouth as needed.  ranitidine (ZANTAC) 150 mg tablet Take 150 mg by mouth two (2) times a day. No current facility-administered medications for this visit. Past Surgical History:   Procedure Laterality Date    HX PACEMAKER  2002    Demand cardiac pacemaker-Had RV lead perforating and [facility] sternot[facility]. Diagnostic Studies:  CARDIOLOGY STUDIES 3/10/2015 9/17/2013   PFT's with DLCO Result 84%DLCO 94%DLCO   Some recent data might be hidden       Visit Vitals  /58   Pulse 68   Ht 5' (1.524 m)   Wt 73.9 kg (163 lb)   BMI 31.83 kg/m²       Ms. Janet Cervantes has a reminder for a \"due or due soon\" health maintenance. I have asked that she contact her primary care provider for follow-up on this health maintenance. Physical Exam   Constitutional: She is oriented to person, place, and time. She appears well-developed and well-nourished. No distress. HENT:   Head: Normocephalic and atraumatic. Mouth/Throat: Normal dentition. Eyes: Right eye exhibits no discharge. Left eye exhibits no discharge. No scleral icterus. Neck: Neck supple. No JVD present. Carotid bruit is not present. No thyromegaly present. Cardiovascular: Normal rate, regular rhythm, S1 normal, S2 normal, normal heart sounds and intact distal pulses. Exam reveals no gallop and no friction rub. No murmur heard. Pulmonary/Chest: Effort normal and breath sounds normal. She has no wheezes. She has no rales. Abdominal: Soft. She exhibits no mass. There is no tenderness. Musculoskeletal: She exhibits edema (1+). Lymphadenopathy:        Right cervical: No superficial cervical adenopathy present. Left cervical: No superficial cervical adenopathy present. Neurological: She is alert and oriented to person, place, and time. Skin: Skin is warm and dry. No rash noted. Psychiatric: She has a normal mood and affect. Her behavior is normal.       ASSESSMENT and PLAN    HLD: LIPID COMPLETE PANELResulted: 7/23/2019 3:30 PM  [address]  Component Name Value Ref Range   Cholesterol 134 110 - 200 mg/dL   Triglyceride 76 40 - 149 mg/dL   HDL 54 40 - 59 mg/dL   Cholesterol/HDL 2.5 0.0 - 5.0    LDL CALCULATION 65 50 - 99 mg/dL   VLDL CALCULATION 15 8 - 30 mg/dL       History of amiodarone therapy: 12/17 78%DLCO    LFT : Normal 2/18; 11/18    TSH: Normal 9/18; 7/19  Pacemaker DDD:   Office Checks:  6/18(frequent AHR); 9/19 normal function  Remote Checks :    Back pain may be musculoskeletal but seems to be improving as Pravachol was reduced to every other day. Continue the present dose  Routine LFTs due to amiodarone treatment. Blood pressure is well controlled. Patient has lost significant weight due to the new diagnosis of Crohn's disease. Pacemaker function is normal.  She continues with paroxysmal A. fib.   Continue anticoagulation. Diagnoses and all orders for this visit:    1. Chronic diastolic heart failure (HCC)    2. Paroxysmal atrial fibrillation ([address]    3. Morbid obesity ([facility] [address]    4. Cardiac pacemaker in situ-DDD  -     PROGRAM EVAL (IN PERSON) IMPLANT DEVICE,PACEMAKER,MULT LEAD    5. History of amiodarone therapy    6. Essential hypertension    7. Pure hypercholesterolemia  -     HEPATIC FUNCTION PANEL; Future  -     LIPID PANEL; Future        Pertinent laboratory and test data reviewed and discussed with patient. See patient instructions also for other medical advice given    Medications Discontinued During This Encounter   Medication Reason    furosemide (LASIX) 40 mg tablet        Follow-up and Dispositions    · Return in about 6 months (around 3/20/2020), or if symptoms worsen or fail to improve.

## 2019-11-05 NOTE — PATIENT INSTRUCTIONS
Arthritis: Care Instructions  Your Care Instructions  Arthritis, also called osteoarthritis, is a breakdown of the cartilage that cushions your joints. When the cartilage wears down, your bones rub against each other. This causes pain and stiffness. Many people have some arthritis as they age. Arthritis most often affects the joints of the spine, hands, hips, knees, or feet. You can take simple measures to protect your joints, ease your pain, and help you stay active. Follow-up care is a key part of your treatment and safety. Be sure to make and go to all appointments, and call your doctor if you are having problems. It's also a good idea to know your test results and keep a list of the medicines you take. How can you care for yourself at home? · Stay at a healthy weight. Being overweight puts extra strain on your joints. · Talk to your doctor or physical therapist about exercises that will help ease joint pain. ? Stretch. You may enjoy gentle forms of yoga to help keep your joints and muscles flexible. ? Walk instead of jog. Other types of exercise that are less stressful on the joints include riding a bicycle, swimming, lisette chi, or water exercise. ? Lift weights. Strong muscles help reduce stress on your joints. Stronger thigh muscles, for example, take some of the stress off of the knees and hips. Learn the right way to lift weights so you do not make joint pain worse. · Take your medicines exactly as prescribed. Call your doctor if you think you are having a problem with your medicine. · Take pain medicines exactly as directed. ? If the doctor gave you a prescription medicine for pain, take it as prescribed. ? If you are not taking a prescription pain medicine, ask your doctor if you can take an over-the-counter medicine. · Use a cane, crutch, walker, or another device if you need help to get around. These can help rest your joints.  You also can use other things to make life easier, such as a higher toilet seat and padded handles on kitchen utensils. · Do not sit in low chairs, which can make it hard to get up. · Put heat or cold on your sore joints as needed. Use whichever helps you most. You also can take turns with hot and cold packs. ? Apply heat 2 or 3 times a day for 20 to 30 minutes--using a heating pad, hot shower, or hot pack--to relieve pain and stiffness. ? Put ice or a cold pack on your sore joint for 10 to 20 minutes at a time. Put a thin cloth between the ice and your skin. When should you call for help? Call your doctor now or seek immediate medical care if:    · You have sudden swelling, warmth, or pain in any joint.     · You have joint pain and a fever or rash.     · You have such bad pain that you cannot use a joint.    Watch closely for changes in your health, and be sure to contact your doctor if:    · You have mild joint symptoms that continue even with more than 6 weeks of care at home.     · You have stomach pain or other problems with your medicine. Where can you learn more? Go to http://margarita-eddie.info/. Enter L554 in the search box to learn more about \"Arthritis: Care Instructions. \"  Current as of: April 1, 2019  Content Version: 12.2  © 2666-6052 [employer]. Care instructions adapted under license by [employer] (which disclaims liability or warranty for this information). If you have questions about a medical condition or this instruction, always ask your healthcare professional. [address] any warranty or liability for your use of this information.

## 2019-11-05 NOTE — PROGRESS NOTES
[address]  [address], [address]  [place], [address] [place] [address] Street  Phone: [phone]  Fax: [phone]  PROGRESS NOTE  Patient: Turnre Bowman                MRN: [id number]       SSN: [id number]  YOB: 1935        AGE: 81 y.o. SEX: female  Body mass index is 31.64 kg/m². PCP: Sonia Farmer DO  11/05/19    Chief Complaint   Patient presents with    Back Pain     back pain f/u        HISTORY OF PRESENT ILLNESS:  Turner Bowman is a 81 y.o.  female with history of chronic neck and back pain. She has no radicular pain. Prior history of back problems: L4-5 Grade I Spondy and severe facet arthropathy L5-S1, hx sacral Fx w/ sacroplasty per CT 2016. At last OV back in 2017 she was to f/o PRN. Nehemiah Maurer comes in w/ some R-sided low back and neck pain for the last 6 months without any injury, it is in her thoraco-lumbar region and separate R neck pain. She has been to the chiropractor w/ massage w/ some benefit. Pain is aching, stabbing and throbbing, pain is worse with manual/sedentary work, walking and affects sleep and recreational activities. Pain is better with massage, relaxation and heating pad. Denies bladder/bowel dysfunction, saddle paresthesia, weakness, gait disturbance, or other neurological deficits. Medications:  Has tried muscle relaxer, Creams without benefit. On Eliquis. PMHx: Pacemaker, Chron's Disease      ASSESSMENT   Diagnoses and all orders for this visit:    1. Neck pain  -     REFERRAL TO PHYSICAL THERAPY    2. Thoracolumbar back pain  -     REFERRAL TO PHYSICAL THERAPY    Other orders  -     methylPREDNISolone (MEDROL, BERNY,) 4 mg tablet; Per dose pack instructions  -     lidocaine (LIDODERM) 5 %; 1 Patch by TransDERmal route every twenty-four (24) hours. Apply patch to the affected area for 12 hours and remove for 12 hours .          IMPRESSION AND PLAN:  This is a pt with arthritic and myofascial back pain. We discussed tx options. She would like to try some PT and medications. > Pt was given information on back stretches   > MDP and lidoderm patches  > PT  > Ms. Thom Dave has a reminder for a \"due or due soon\" health maintenance. I have asked that she contact her primary care provider, Inocencia Seymour DO, for follow-up on this health maintenance.  > We have informed patient to notify us for immediate appointment if he has any worsening neurogical symptoms or if an emergency situation presents, then call 911  >  has been reviewed and is appropriate  > Pt will follow-up in 6 wks w/ me. Subjective    Work retired    Smoking Status non smoker    Pain Scale: 8/10    Pain Assessment  11/5/2019   Location of Pain Back   Location Modifiers Right   Severity of Pain 8   Quality of Pain Dull;Aching; Throbbing   Duration of Pain A few hours   Frequency of Pain Intermittent   Aggravating Factors Standing;Walking   Limiting Behavior Some   Relieving Factors Rest   Relieving Factors Comment -   Result of Injury No         REVIEW OF SYSTEMS  Constitutional: Negative for fever, chills, or weight change. Respiratory: Negative for cough or shortness of breath. Cardiovascular: Negative for chest pain or palpitations. Gastrointestinal: Negative for incontinence, acid reflux, change in bowel habits, or constipation. Genitourinary: Negative for incontinence, dysuria and flank pain. Musculoskeletal: Positive for back pain. See HPI. Skin: Negative for rash. Neurological:no  radiculopathy. See HPI. Endo/Heme/Allergies: Negative. Psychiatric/Behavioral: Negative. PHYSICAL EXAMINATION  Visit Vitals  /84   Pulse 79   Temp 98.1 °F (36.7 °C) (Oral)   Resp 16   Ht 5' (1.524 m)   Wt 162 lb (73.5 kg)   SpO2 97%   BMI 31.64 kg/m²         Accompanied by family member. Constitutional:  Well developed, well nourished, in no acute distress. Psychiatric: Affect and mood are appropriate.    Integumentary: No rashes or abrasions noted on exposed areas. Cardiovascular/Peripheral Vascular: +2 radial & pedal pulses. No peripheral edema is noted. Lymphatic:  No evidence of lymphedema. No cervical lymphadenopathy. SPINE/MUSCULOSKELETAL EXAM  Cervical spine:  Neck is midline. Normal muscle tone. No focal atrophy is noted. Neck ROM decreased with flexion, extension, turning right, turning left. Shoulder ROM intact. Tenderness to palpation R anterior neck. Negative Spurling's sign. Negative Tinel's sign. Negative Cole's sign. Sensation grossly intact to light touch. Lumbar spine:  No rash, ecchymosis, or gross obliquity. No fasciculations. No focal atrophy is noted. Range of motion is decreased with extension. Tenderness to palpation R low back. No tenderness to palpation at the sciatic notch. SI joints non-tender. Trochanters non tender. Straight leg raise neg    Sensation grossly intact to light touch. MOTOR:        Biceps  Triceps Deltoids Wrist Ext Wrist Flex Hand Intrin   Right +4/5 +4/5 +4/5 +4/5 +4/5 +4/5   Left +4/5 +4/5 +4/5 +4/5 +4/5 +4/5      Hip Flex Quads Hamstrings Ankle DF EHL Ankle PF   Right +4/5 +4/5 +4/5 +4/5 +4/5 +4/5   Left +4/5 +4/5 +4/5 +4/5 +4/5 +4/5       DTRs are 1+ biceps, triceps, brachioradialis, patella, and Achilles. Ambulation with single point cane. FWB. Kyphosis and + 's cart        PAST MEDICAL HISTORY   Past Medical History:   Diagnosis Date    Atrial fibrillation ([address]     Balance problem     Chest pain, unspecified     Resolved.  Chronic diastolic heart failure (HCC)     Stable symptoms.     Congestive heart failure, unspecified     Diarrhea 12/15/2015    f/u PCP     Essential hypertension     High blood pressure     Low back pain     Morbid obesity (HCC)     On amiodarone therapy 6/26/2015 12/14 SGOT45, SGPT41, TSH 2.7 3/10 84%DLCO     Other and unspecified hyperlipidemia     Pacemaker     Reflux     Trauma     Unspecified disorder of thyroid     Min hyperthyroidism. Past Surgical History:   Procedure Laterality Date    HX PACEMAKER  2002    Demand cardiac pacemaker-Had RV lead perforating and [facility] sternot[facility] MEDICATIONS      Current Outpatient Medications   Medication Sig Dispense Refill    acetaminophen-codeine (TYLENOL-CODEINE #3) 300-30 mg per tablet Take 1 Tab by mouth every four (4) hours as needed for Pain.  dicyclomine (BENTYL) 10 mg capsule Take 10 mg by mouth 4 times daily (before meals and nightly).  methocarbamol (ROBAXIN) 500 mg tablet Take  by mouth four (4) times daily.  methylPREDNISolone (MEDROL, BERNY,) 4 mg tablet Per dose pack instructions 1 Dose Pack 0    lidocaine (LIDODERM) 5 % 1 Patch by TransDERmal route every twenty-four (24) hours. Apply patch to the affected area for 12 hours and remove for 12 hours . 30 Each 1    amiodarone (CORDARONE) 200 mg tablet TAKE 1 TABLET BY MOUTH DAILY 90 Tab 0    ELIQUIS 5 mg tablet TAKE 1 TABLET BY MOUTH TWICE DAILY 60 Tab 0    valsartan-hydroCHLOROthiazide (DIOVAN-HCT) 160-25 mg per tablet TAKE 1 TABLET BY MOUTH EVERY DAY 90 Tab 0    ezetimibe (ZETIA) 10 mg tablet TAKE 1 TABLET BY MOUTH DAILY 90 Tab 0    pravastatin (PRAVACHOL) 40 mg tablet TAKE 1 TABLET BY MOUTH EVERY NIGHT 90 Tab 0    ZETIA 10 mg tablet TAKE 1 TABLET BY MOUTH DAILY 90 Tab 0    potassium chloride (KLOR-CON) 10 mEq tablet TAKE 1 TABLET BY MOUTH DAILY 90 Tab 0    amLODIPine (NORVASC) 2.5 mg tablet TAKE 1 TABLET BY MOUTH DAILY 90 Tab 0    mometasone (NASONEX) 50 mcg/actuation nasal spray 2 Sprays daily.  benzonatate (TESSALON PERLES) 100 mg capsule Take 100 mg by mouth three (3) times daily as needed for Cough.  cyclobenzaprine (FLEXERIL) 5 mg tablet Take 5 mg by mouth as needed.  CALCIUM CARBONATE-VITAMIN D2 PO Take  by mouth.  polyethylene glycol (MIRALAX) 17 gram packet Take 17 g by mouth as needed.       ranitidine (ZANTAC) 150 mg tablet Take 150 mg by mouth two (2) times a day. ALLERGIES    Allergies   Allergen Reactions    Penicillins Unknown (comments)     Pt states she's not allergic          SOCIAL HISTORY    Social History     Socioeconomic History    Marital status:      Spouse name: Not on file    Number of children: Not on file    Years of education: Not on file    Highest education level: Not on file   Occupational History    Not on file   Social Needs    Financial resource strain: Not on file    Food insecurity:     Worry: Not on file     Inability: Not on file    Transportation needs:     Medical: Not on file     Non-medical: Not on file   Tobacco Use    Smoking status: Never Smoker    Smokeless tobacco: Never Used   Substance and Sexual Activity    Alcohol use: No     Alcohol/week: 0.0 standard drinks    Drug use: No    Sexual activity: Not on file   Lifestyle    Physical activity:     Days per week: Not on file     Minutes per session: Not on file    Stress: Not on file   Relationships    Social connections:     Talks on phone: Not on file     Gets together: Not on file     Attends Oriental orthodox service: Not on file     Active member of club or organization: Not on file     Attends meetings of clubs or organizations: Not on file     Relationship status: Not on file    Intimate partner violence:     Fear of current or ex partner: Not on file     Emotionally abused: Not on file     Physically abused: Not on file     Forced sexual activity: Not on file   Other Topics Concern    Not on file   Social History Narrative    Not on file       FAMILY HISTORY    Family History   Problem Relation Age of Onset    Heart Attack Father 39    Sudden Death Father     Hypertension Mother     Heart Disease Neg Hx         No history of ischemic heart disease.     Stroke Neg Hx          Nicholas Vegas NP

## 2019-11-07 NOTE — TELEPHONE ENCOUNTER
Madai with [address] called asking to speak with clinical regarding the prior auth needed for the lidocaine (LIDODERM) 5 % patches.      Please call: [phone]  (option 0, option 3, then option 3)     Reference Number: [id number]

## 2019-11-11 NOTE — TELEPHONE ENCOUNTER
PA denied  Reason: The reason the request was denied is that some of the requirements needed in order to approve the request have not been met. The specific info we need to make a determination is: diagnosis of pain associated with diabetic neuropathy (nerve damage due to diabetes), cancer-related neuropathy (nerve damage due to cancer), or post-herpetic neuralgia (nerve damage due to shingles).

## 2019-11-12 NOTE — PROGRESS NOTES
[address]  [address] [place], [address] 
[phone] fax Plan of Care/ Statement of Necessity for Physical Therapy Services Patient name: Antonette Stout Start of Care: 2019 Referral source: Joan Cole NP : 1935 Medical Diagnosis: Cervicalgia [M54.2] Low back pain [M54.5] Pain in thoracic spine [M54.6] Payor: [place] MEDICARE / Plan: Linguastaty / Product Type: Medicare /  Onset Date:29 Treatment Diagnosis: cervicalgia, dorsalgia, muscle spasm Prior Hospitalization: see medical history Provider#: [id number] Medications: Verified on Patient summary List  
 Comorbidities: bladder stone, varicose veins B LE, HBP, pacemaker Prior Level of Function: past hx of thoracic pain with prior episode of PT care for it. The Plan of Care and following information is based on the information from the initial evaluation. Assessment/ key information: Pt presents to PT evaluation with daughter present and translating. Primary language is Farsi. Pt has presents with cervicalgia and dorsalgia and muscle spasm along right side of spine and neck. Pain travels lateral right chest and lower back. Insidious onset for 1 year. Pain now: 8/10, getting in/out of car 8/10, lay down 6/10. Pain limits her from doing anything but laying down. Pain is constant but intermittent with sudden onset increased pain and then subsides. Feels like muscle spasm and feel weak. Was given muscle relaxer but not helping. Heat helps. X-ray: no fracture, US for kidney and gal bladder. Has bladder stone but not appropriate for surgery. Finished course of antibiotic for urinary infection and was cleared. HBP and pacemaker and crohn's disease so doctor doesn't want to put needle in muscle. Denies DM or osteoporosis.   Both feet feel like they're sleeping right > left for along time and doctor knows. Feet get cold when first lay down, pt denies any color changes in feet but does have varicose veins B. Right handed. Goals: feel better, return to going outside and shopping with 4WW, in and out of car with less pain. Pt may benefit from skilled PT services to improve functional capacity and return her to improved mobility. Prognosis: fair due to length of time and prior hx of having back pain. Evaluation Complexity History HIGH Complexity :3+ comorbidities / personal factors will impact the outcome/ POC ; Examination MEDIUM Complexity : 3 Standardized tests and measures addressing body structure, function, activity limitation and / or participation in recreation  ;Presentation MEDIUM Complexity : Evolving with changing characteristics  ; Clinical Decision Making MEDIUM Complexity : FOTO score of 26-74 Overall Complexity Rating: MEDIUM Problem List: pain affecting function, decrease ROM, decrease strength, impaired gait/ balance, decrease ADL/ functional abilitiies, decrease activity tolerance, decrease flexibility/ joint mobility and decrease transfer abilities Treatment Plan may include any combination of the following: Therapeutic exercise, Therapeutic activities, Neuromuscular re-education, Physical agent/modality, Gait/balance training, Manual therapy, Patient education, Self Care training, Functional mobility training and Home safety training. Pt declined aquatic therapy. Patient / Family readiness to learn indicated by: trying to perform skills and interest 
Persons(s) to be included in education: patient (P) and family support person (FSP);list daughter. Barriers to Learning/Limitations: yes;  language Patient Goal (s): see above Patient Self Reported Health Status: poor Rehabilitation Potential: fair Short Term Goals: To be accomplished in 2 weeks: 
 1) initiate HEP and progress as able  2) no pain in lumbar with cervical return to neutral from FB, cervical rot = B with no lumbar pain to improve upper trunk mobility for functional activities. 3) improve trunk strength to eliminate pain when test core/tricep strength of UE to improve ability to ambulate community distances and get in/out of car. 4) increase trunk rotation to = B to improve ability to get in/out of car. Long Term Goals: To be accomplished in 4 weeks: 
 1) I HEP for self maintenance of improved functional capacity. 2) pt to report no difficulty getting in/out of car. 3) pt to report being able to go to grocery store. 4) improve FOTO from 56 to 64. Frequency / Duration: Patient to be seen 2 times per week for 4 weeks. Patient/ Caregiver education and instruction: Diagnosis, prognosis, activity modification and exercises 
 [x]  Plan of care has been reviewed with PTA Certification Period: 11/12/19-12/11/19 Pamela Tan, PT 11/12/2019 8:07 AM 
 
________________________________________________________________________ I certify that the above Therapy Services are being furnished while the patient is under my care. I agree with the treatment plan and certify that this therapy is necessary. [de-identified] Signature:____________________  Date:____________Time: _________ Please sign and return to [address] [place], [address] 
[phone] fax

## 2019-11-12 NOTE — PROGRESS NOTES
PT DAILY TREATMENT NOTE/CERVICAL [id number] Patient Name: Pedro Powell 
Date:2019 : 1935 [x]  Patient  Verified Payor: [place] MEDICARE / Plan: Jonny Cope y / Product Type: Medicare / In time:9:45  Out time:10:43 Total Treatment Time (min): 58 Visit #: 1 of 8 Medicare/BCBS Only Total Timed Codes (min):  58 1:1 Treatment Time:  62 Treatment Area: Cervicalgia [M54.2] Low back pain [M54.5] Pain in thoracic spine [M54.6] SUBJECTIVE Pain Level (0-10 scale): 8/10 []constant [x]intermittent []improving []worsening []no change since onset Any medication changes, allergies to medications, adverse drug reactions, diagnosis change, or new procedure performed?: [x] No    [] Yes (see summary sheet for update) Subjective functional status/changes:    
See POC summary, pt was able to go outside, grocery shop and get in/out of car without pain prior to 1 year ago. 33 min -Eval    completed              -Re-Eval  
 
 
15 min Therapeutic Exercise:  [] See flow sheet :see handout, pt needed cues to maintain pure SB c-spine and not BB. Rationale: increase ROM and decrease pain to improve the patients ability to improve functional capacity 10 min Manual: attempted muscle energy technique to improve right upslide of ~T 7-8 but pt wasn't able to follow instructions/instructions lost in translation. Rationale: increase ROM and decrease pain to improve the patients ability to improve functional capacity With 
 [] TE 
 [] TA 
 [] neuro 
 [] other: Patient Education: [x] Review HEP: pt educated on using pure side bend C-spine to stretch lateral neck flexors and educated not to BB c-spine. [] Progressed/Changed HEP based on:  
[] positioning   [] body mechanics   [] transfers   [] heat/ice application   
[] other:   
 
Other Objective/Functional Measures: none Physical Therapy Evaluation Cervical Spine SUBJECTIVE: see POC summary OBJECTIVE: 
 Posture: increased lordotic-kyphotic posture with dowager's hump and slight sagittal curvature in thoracic spine. Daughter reports no diagnosis of scoliosis in past. 
GH flex, ABD, IR WFL. AROM C-spine: full FB and pain upon return to neutral, BB ~50% with pain ant neck, SB = B and WFL, rot right ~25% with pain right lower back, rot left WFL. AROM trunk SB leaning onto elbow seated: WNL though pt reports pain right lumbar to right side. AROM trunk rot seated: ~50% rot right, ~25% rot left with pain right lower back. MMT hips 5/5 seated, general testing. MMT UE push right 4/5 and left 5/5 with pain in right lower back; pull 5/5. Tenderness to palpation: + right ~T7-8 laminar groove and along rib 7 and 8 pathway.  + right paraspinals T nad L spines. Gait: forward bent with FWW. Transfers: SBA. Edema right LE with tourniquet affect from sock, varicose veins present, no discoloration. Other tests/comments: none Pain Level (0-10 scale) post treatment: pt noted back felt a little better, 7-8/10 ASSESSMENT/Changes in Function: Language barrier may limit some understanding of hx and instruction. Pt needed extra cuing for movement patterning and would benefit from review of current HEP provided. Patient will continue to benefit from skilled PT services to modify and progress therapeutic interventions, address functional mobility deficits, address ROM deficits, address strength deficits, analyze and address soft tissue restrictions, analyze and cue movement patterns, analyze and modify body mechanics/ergonomics, assess and modify postural abnormalities and instruct in home and community integration to attain remaining goals. [x]  See Plan of Care 
[]  See progress note/recertification 
[]  See Discharge Summary Short Term Goals: To be accomplished in 2 weeks: 
            1) initiate HEP and progress as able             2) no pain in lumbar with cervical return to neutral from FB, cervical rot = B with no lumbar pain to improve upper trunk mobility for functional activities. 3) improve trunk strength to eliminate pain when test core/tricep strength of UE to improve ability to ambulate community distances and get in/out of car. 4) increase trunk rotation to = B to improve ability to get in/out of car. Long Term Goals: To be accomplished in 4 weeks: 
            1) I HEP for self maintenance of improved functional capacity. 2) pt to report no difficulty getting in/out of car. 3) pt to report being able to go to grocery store. 4) improve FOTO from 56 to 64. PLAN [x]  Upgrade activities as tolerated     [x]  Continue plan of care 
[]  Update interventions per flow sheet      
[]  Discharge due to:_ 
[]  Other:_   
 
Thony Benton, PT 11/12/2019  5:17 PM

## 2019-11-18 NOTE — PROGRESS NOTES
PT DAILY TREATMENT NOTE 10-18 Patient Name: Lupe Kauffman 
Date:2019 : 1935 [x]  Patient  Verified Payor: [place] MEDICARE / Plan: Jonny Hernandezy / Product Type: Medicare / In time:10:30  Out time:11:20 Total Treatment Time (min): 50 Visit #: 2 of 8 Medicare/BCBS Only Total Timed Codes (min):  40 1:1 Treatment Time:  40 First 8' TE with tech, no charge. Treatment Area: Cervicalgia [M54.2] Low back pain [M54.5] Pain in thoracic spine [M54.6] SUBJECTIVE Pain Level (0-10 scale): 7/10 Any medication changes, allergies to medications, adverse drug reactions, diagnosis change, or new procedure performed?: [x] No    [] Yes (see summary sheet for update) Subjective functional status/changes:   [] No changes reported Pain around right lower and mid back. Last used declofenac cream Friday with Vics. Have been using declofenac cream for a couple of months. OBJECTIVE 32 min Therapeutic Exercise:  [x] See flow sheet :  Changed SB c-spine to \"look at hip\" after manual due to pt reporting that pure SB increases pain with repetition. Held rows due to increased mid right back pain. Rationale: increase ROM and increase strength to improve the patients ability to self manage pain and meet goals. 8 min Manual Therapy:  S/C inhibition to decrease UT tension. MFR not done on back due to rash. Rationale: decrease pain, increase ROM and increase tissue extensibility to improve pt's ability to perform HEP to meet goals. With 
 [] TE 
 [] TA 
 [] neuro 
 [] other: Patient Education: [x] Review HEP [] Progressed/Changed HEP based on:  
[] positioning   [] body mechanics   [] transfers   [] heat/ice application   
[x] other: pt to see MD prior to next PT appointment to clear rash.   
 
Other Objective/Functional Measures: pt has multiple open wounds and scabbed over wounds, red around the wounds, all about pinky nail size, in mid back to low back right side. Seated c-spine distraction: decreased pt pain. Pain Level (0-10 scale) post treatment: 4/10 ASSESSMENT/Changes in Function: Pt continuing to have mid to low back pain, reproduced with rows. Also continuing to have right sided neck pain, reproduced with SB c-spine. Changed c-spine AROM stretch to \"look at hip\" to stretch post fibers UT since releasing UT helped decrease right sided neck pain. Pt has difficulty engaging gluteals with sit --> stand and mid to end range of motion return to stand. Due to rash on mid to low right lower back, no manual techniques performed and pt to see MD prior to next PT visit. Pt and daughter appear to understand this and the importance of protecting the area ie no more scratching. Patient will continue to benefit from skilled PT services to modify and progress therapeutic interventions, address functional mobility deficits, address ROM deficits, address strength deficits, analyze and address soft tissue restrictions, analyze and cue movement patterns, analyze and modify body mechanics/ergonomics, assess and modify postural abnormalities, address imbalance/dizziness and instruct in home and community integration to attain remaining goals. [x]  See Plan of Care 
[]  See progress note/recertification 
[]  See Discharge Summary Progress towards goals / Updated goals: 
Short Term Goals: To be accomplished in 2 weeks: 
            1) initiate HEP and progress as able 2) no pain in lumbar with cervical return to neutral from FB, cervical rot = B with no lumbar pain to improve upper trunk mobility for functional activities. Current: no met 11/18/19 
            3) improve trunk strength to eliminate pain when test core/tricep strength of UE to improve ability to ambulate community distances and get in/out of car. 4) increase trunk rotation to = B to improve ability to get in/out of car. Long Term Goals: To be accomplished in 4 weeks: 
            1) I HEP for self maintenance of improved functional capacity. 2) pt to report no difficulty getting in/out of car. 3) pt to report being able to go to grocery store. 4) improve FOTO from 56 to 64. PLAN 
[]  Upgrade activities as tolerated     []  Continue plan of care 
[]  Update interventions per flow sheet      
[]  Discharge due to:_ 
[x]  Other:_ Pt to see MD Mann Prior to return to PT just in case rash needs to be further addressed. Jaziel Walters, PT 11/18/2019  10:42 AM 
 
Future Appointments Date Time Provider Dilma Duque 11/20/2019 10:00 AM Singh Irvin, PTA [facility]  
11/25/2019 10:00 AM Ruth Akbar, PTA [facility]  
11/27/2019  9:30 AM Singh Irvin, PTA [facility]  
12/2/2019 10:00 AM Rita Kaufman, PT [facility]  
12/4/2019 10:00 AM Madai Diggs PTA [facility]  
12/17/2019 11:30 AM Aimee Parker, MANAS [facility] SCHED  
12/26/2019  9:15 AM [facility]  
3/19/2020 10:45 AM Katy Landrum MD [address]  
4/1/2020  9:00 AM [facility]  
8/5/2020  8:30 AM [facility]  
11/13/2020 11:15 AM Katy Landrum MD [address]

## 2019-11-21 NOTE — PROGRESS NOTES
PT DAILY TREATMENT NOTE 10-18 Patient Name: Roger Baldwin 
Date:2019 : 1935 [x]  Patient  Verified Payor: [place] MEDICARE / Plan: Jonny Roth / Product Type: Medicare / In time:12:00  Out time:12:37 Total Treatment Time (min): 37 Visit #: 3 of 8 Medicare/BCBS Only Total Timed Codes (min):  37 1:1 Treatment Time:  32 Treatment Area: Cervicalgia [M54.2] Low back pain [M54.5] Pain in thoracic spine [M54.6] SUBJECTIVE Pain Level (0-10 scale): 5/10 Any medication changes, allergies to medications, adverse drug reactions, diagnosis change, or new procedure performed?: [x] No    [] Yes (see summary sheet for update) Subjective functional status/changes:   [] No changes reported Pt reports compliance with HEP. Pt has a note from PCP to continue participation in PT. Pt states her rash is still present but is scabbed over. OBJECTIVE 27 min Therapeutic Exercise:  [x] See flow sheet :  
Rationale: increase ROM and increase strength to improve the patients ability to tolerate ADLs. 10 min Neuromuscular Re-education:  []  See flow sheet :  
Rationale: increase ROM and increase strength  to improve the patients ability to perform functional activities with increased ease. With 
 [] TE 
 [] TA 
 [] neuro 
 [] other: Patient Education: [x] Review HEP [] Progressed/Changed HEP based on:  
[] positioning   [] body mechanics   [] transfers   [] heat/ice application   
[] other:   
 
Other Objective/Functional Measures: added supine pilates ball exercises, supine LTR and SKC. Pain Level (0-10 scale) post treatment: 4/10 ASSESSMENT/Changes in Function: Pt demonstrates continued limitations with cervical side bending and performing sit to stands without UE support.   
 
Patient will continue to benefit from skilled PT services to modify and progress therapeutic interventions, address functional mobility deficits, address ROM deficits, address strength deficits, analyze and address soft tissue restrictions, analyze and cue movement patterns, analyze and modify body mechanics/ergonomics and assess and modify postural abnormalities to attain remaining goals. []  See Plan of Care 
[]  See progress note/recertification 
[]  See Discharge Summary Progress towards goals / Updated goals: 
Short Term Goals: To be accomplished in 2 weeks: 
            8) initiate HEP and progress as able Current: Initaited per patient report. 11/21/19 
            2) no pain in lumbar with cervical return to neutral from FB, cervical rot = B with no lumbar pain to improve upper trunk mobility for functional activities. Current: no met 11/18/19 
            3) improve trunk strength to eliminate pain when test core/tricep strength of UE to improve ability to ambulate community distances and get in/out of car. 
            4) increase trunk rotation to = B to improve ability to get in/out of car. Long Term Goals: To be accomplished in 4 weeks: 
            1) I HEP for self maintenance of improved functional capacity.             2) pt to report no difficulty getting in/out of car. 
[phone]) pt to report being able to go to grocery store. 
[phone]) improve FOTO from 56 to 64. PLAN 
[]  Upgrade activities as tolerated     []  Continue plan of care 
[]  Update interventions per flow sheet      
[]  Discharge due to:_ 
[]  Other:_ Kelli Kang, PTA 11/21/2019  12:24 PM 
 
Future Appointments Date Time Provider Dilma Duque 11/25/2019 10:00 AM Yossi Hua, PTA [facility]  
11/27/2019  9:30 AM [facility]  
12/2/2019 10:00 AM [facility]on[facility]  
12/4/2019 10:00 AM Madai Diggs, PTA [facility]  
12/17/2019 11:30 AM Prosper Simpson, MANAS [address] 12/26/2019  9:15 AM [facility]  
 3/19/2020 10:45 AM Taylor Shankar  [facility]  
4/1/2020  9:00 AM [facility]  
8/5/2020  8:30 AM [facility]  
11/13/2020 11:15 AM Taylor Shankar MD [address] [place] [address]

## 2019-11-25 NOTE — PROGRESS NOTES
PT DAILY TREATMENT NOTE 10-18 Patient Name: Valentin Stephen 
Date:2019 : 1935 [x]  Patient  Verified Payor: [place] MEDICARE / Plan: Jonny Cope y / Product Type: Medicare / In time:1000  Out time:1037 Total Treatment Time (min): 37 Visit #: 4 of 8 Medicare/BCBS Only Total Timed Codes (min):  37 1:1 Treatment Time:  25 Treatment Area: Cervicalgia [M54.2] Low back pain [M54.5] Pain in thoracic spine [M54.6] SUBJECTIVE Pain Level (0-10 scale): 3 Any medication changes, allergies to medications, adverse drug reactions, diagnosis change, or new procedure performed?: [x] No    [] Yes (see summary sheet for update) Subjective functional status/changes:   [] No changes reported Patient reported mid back pain upon arrival 
 
OBJECTIVE Modality rationale:  PD Min Type Additional Details  
 [] Estim:  []Unatt       []IFC  []Premod []Other:  []w/ice   []w/heat Position: Location:  
 [] Estim: []Att    []TENS instruct  []NMES []Other:  []w/US   []w/ice   []w/heat Position: Location:  
 []  Traction: [] Cervical       []Lumbar 
                     [] Prone          []Supine []Intermittent   []Continuous Lbs: 
[] before manual 
[] after manual  
 []  Ultrasound: []Continuous   [] Pulsed []1MHz   []3MHz W/cm2: 
Location:  
 []  Iontophoresis with dexamethasone Location: [] Take home patch  
[] In clinic  
 []  Ice     []  heat 
[]  Ice massage 
[]  Laser  
[]  Anodyne Position: Location:  
 []  Laser with stim 
[]  Other:  Position: Location:  
 []  Vasopneumatic Device Pressure:       [] lo [] med [] hi  
Temperature: [] lo [] med [] hi  
[] Skin assessment post-treatment:  []intact []redness- no adverse reaction 
  []redness  adverse reaction:  
 
 
27 min Therapeutic Exercise:  [x] See flow sheet :  
 Rationale: increase ROM and increase strength to improve the patients ability to perform ADLs 10 min Neuromuscular Re-education:  [x]  See flow sheet :  
Rationale: increase ROM and increase strength  to improve the patients ability to perform ADLs With 
 [] TE 
 [] TA 
 [] neuro 
 [] other: Patient Education: [x] Review HEP [] Progressed/Changed HEP based on:  
[] positioning   [] body mechanics   [] transfers   [] heat/ice application   
[] other:   
 
Other Objective/Functional Measures: progressed therex per flow sheet Pain Level (0-10 scale) post treatment: 2 
 
ASSESSMENT/Changes in Function: patient tolerated progressions well without c/o increased pain. Patient will continue to benefit from skilled PT services to modify and progress therapeutic interventions, address functional mobility deficits, address ROM deficits, address strength deficits, analyze and address soft tissue restrictions and analyze and cue movement patterns to attain remaining goals. [x]  See Plan of Care 
[]  See progress note/recertification 
[]  See Discharge Summary Progress towards goals / Updated goals: 
Short Term Goals: To be accomplished in 2 weeks: 
            3) initiate HEP and progress as able Current: Initaited per patient report. 11/21/19 
            2) no pain in lumbar with cervical return to neutral from FB, cervical rot = B with no lumbar pain to improve upper trunk mobility for functional activities. Current: no met 11/18/19 
            3) improve trunk strength to eliminate pain when test core/tricep strength of UE to improve ability to ambulate community distances and get in/out of car. 
            4) increase trunk rotation to = B to improve ability to get in/out of car. Long Term Goals: To be accomplished in 4 weeks: 
            1) I HEP for self maintenance of improved functional capacity.             2) pt to report no difficulty getting in/out of car. [phone]) pt to report being able to go to grocery store. 
[phone]) improve FOTO from 56 to 64. PLAN 
[]  Upgrade activities as tolerated     [x]  Continue plan of care 
[]  Update interventions per flow sheet      
[]  Discharge due to:_ 
[]  Other:_ Donal Claude, PTA 11/25/2019  10:12 AM 
 
Future Appointments Date Time Provider Dilma Duque 11/27/2019  9:30 AM Azar Hartley, PTA [facility]  
12/2/2019 10:00 AM [facility]  
12/4/2019 10:00 AM [facility] Diggs [facility]  
12/17/2019 11:30 AM Nathaly Ugalde  [address]  
12/26/2019  9:15 AM [facility]  
3/19/2020 10:45 AM Gene Rodriguez MD [address]  
4/1/2020  9:00 AM [facility] SCH[facility]  
8/5/2020  8:30 AM [facility]  
11/13/2020 11:15 AM Gene Rodriguez MD [address]

## 2019-12-02 NOTE — PROGRESS NOTES
PT DAILY TREATMENT NOTE 10-18 Patient Name: Kavya Moreira 
Date:2019 : 1935 [x]  Patient  Verified Payor: [place] MEDICARE / Plan: Jonny Hernandezy / Product Type: Medicare / In time:10:04  Out time:10:52 Total Treatment Time (min): 48 Visit #: 5 of 8 Medicare/BCBS Only Total Timed Codes (min):  48 1:1 Treatment Time:  28 Treatment Area: Cervicalgia [M54.2] Low back pain [M54.5] Pain in thoracic spine [M54.6] SUBJECTIVE Pain Level (0-10 scale): 4-5/10 Any medication changes, allergies to medications, adverse drug reactions, diagnosis change, or new procedure performed?: [x] No    [] Yes (see summary sheet for update) Subjective functional status/changes:   [] No changes reported Just pain in right shoulder area. OBJECTIVE 38 min Therapeutic Exercise:  [x] See flow sheet :  
Rationale: increase ROM and increase strength to improve the patients ability to perform ADLs 10 min Manual Therapy:  Right pec lift and MFR with manual stretching of pec fibers, right lat scap border MFR with manual stretching into GH ABD/flex. Rationale: decrease pain, increase ROM, increase tissue extensibility and increase ability to improve posture to improve pt's ability to participate in ADLs and IADLs. With 
 [] TE 
 [] TA 
 [] neuro 
 [] other: Patient Education: [] Review HEP [] Progressed/Changed HEP based on:  
[] positioning   [] body mechanics   [] transfers   [] heat/ice application   
[x] other: protect healing wound area on back Other Objective/Functional Measures: pt reports no problem get in/out car, push 4/5 decreased core and pull 5/5. Walking well with cart in [address] [place] [address]. Unable to retract scapulae. Skin integrity: two scabbed over wounds with minimal exudate onto shirt noted, other wounds are healing with skin layers. Pain Level (0-10 scale) post treatment: 3-4/10 ASSESSMENT/Changes in Function: Pt reporting she has met all functional goals listed except she continues to have pain in right shoulder complex area. Pt is unable to retract scapulae and this may be in part due to tight pectoral muscles. Pt may benefit from a few more visits to improve shoulder complex mobility/awareness and complete HEP. Patient will continue to benefit from skilled PT services to modify and progress therapeutic interventions, address functional mobility deficits, address ROM deficits, address strength deficits, analyze and address soft tissue restrictions and analyze and cue movement patterns to attain remaining goals. [x]? See Plan of Care 
[]? See progress note/recertification 
[]? See Discharge Summary Progress towards goals / Updated goals: 
Short Term Goals: To be accomplished in 2 weeks: 
            1) initiate HEP and progress as able Current: Initaited per patient report. 11/21/19 
            2) no pain in lumbar with cervical return to neutral from FB, cervical rot = B with no lumbar pain to improve upper trunk mobility for functional activities. Current: no met 11/18/19 
            3) improve trunk strength to eliminate pain when test core/tricep strength of UE to improve ability to ambulate community distances and get in/out of car. Current: no pain, decreased trunk stability noted with pushing. 12/2/19 
            4) increase trunk rotation to = B to improve ability to get in/out of car. Current: ROM not assessed but pt reports she met functional goal 12/2/19 Long Term Goals: To be accomplished in 4 weeks: 
            1) I HEP for self maintenance of improved functional capacity.             2) pt to report no difficulty getting in/out of car. Current: met 12/2/19 
            3) pt to report being able to go to grocery store.  
Current: met 12/2/19 
            4) improve FOTO from 56 to 64. 
  
PLAN 
 []?  Upgrade activities as tolerated     [x]? Continue plan of care 
[]? Update interventions per flow sheet       
[]? Discharge due to:_ 
[x]? Other:_pec stretch and resisted [facility] ER for HEP, schedule more appointments?, recheck STG #2 Talisha Bonner, PT 12/2/2019  10:08 AM 
 
Future Appointments Date Time Provider Dilma Ramirezi 12/4/2019 10:00 AM Parrish Velasco, PTA [facility]  
12/17/2019 11:30 AM Dione Sahni  [address]  
12/26/2019  9:15 AM [facility]  
3/19/2020 10:45 AM Sarah Quarles MD [id number] [place] [address] Rd  
4/1/2020  9:00 AM [facility] SCHED  
8/5/2020  8:30 AM [facility] SCHED  
11/13/2020 11:15 AM Sarah Quarles MD [id number] [place] [address] Rd

## 2019-12-04 NOTE — PROGRESS NOTES
PT DAILY TREATMENT NOTE 10-18 Patient Name: Dick Gee 
Date:2019 : 1935 [x]  Patient  Verified Payor: [place] MEDICARE / Plan: Jonny Roth / Product Type: Medicare / In time:10:01  Out time:10:37 Total Treatment Time (min): 36 Visit #: 6 of 8 Medicare/BCBS Only Total Timed Codes (min):  36 1:1 Treatment Time:  30 Treatment Area: Cervicalgia [M54.2] Low back pain [M54.5] Pain in thoracic spine [M54.6] SUBJECTIVE Pain Level (0-10 scale): 3-4 Any medication changes, allergies to medications, adverse drug reactions, diagnosis change, or new procedure performed?: [x] No    [] Yes (see summary sheet for update) Subjective functional status/changes:   [] No changes reported Pt reports that she is feeling better, a bit of right shoulder/neck discomfort OBJECTIVE 36 min Therapeutic Exercise:  [] See flow sheet :  
Rationale: increase ROM and increase strength to improve the patients ability to perform ADLs and transfers with increased ease With 
 [] TE 
 [] TA 
 [] neuro 
 [] other: Patient Education: [x] Review HEP [] Progressed/Changed HEP based on:  
[] positioning   [] body mechanics   [] transfers   [] heat/ice application   
[] other:   
 
Other Objective/Functional Measures:   
 
Pain Level (0-10 scale) post treatment: 2-3 
 
ASSESSMENT/Changes in Function: Pt reports that she feels comfortable with D/C to HEP today. She has met most LTG's, slight decline in FOTO score not consistent with care to this time. Updated HEP with explanation to patient and daughter Patient will continue to benefit from skilled PT services to modify and progress therapeutic interventions, address functional mobility deficits, address ROM deficits, address strength deficits, analyze and address soft tissue restrictions, analyze and cue movement patterns and analyze and modify body mechanics/ergonomics to attain remaining goals. []  See Plan of Care []  See progress note/recertification 
[]  See Discharge Summary Progress towards goals / Updated goals: 
Short Term Goals: To be accomplished in 2 weeks: 
            4) initiate HEP and progress as able Current: Initaited per patient report. 11/21/19 
            2) no pain in lumbar with cervical return to neutral from FB, cervical rot = B with no lumbar pain to improve upper trunk mobility for functional activities. Current: mild right sided cervical pain with rotation otherwise unremarkable 12/4/19 
            3) improve trunk strength to eliminate pain when test core/tricep strength of UE to improve ability to ambulate community distances and get in/out of car. Current: no pain, decreased trunk stability noted with pushing. 12/2/19 
            4) increase trunk rotation to = B to improve ability to get in/out of car. Current: ROM not assessed but pt reports she met functional goal 12/2/19 Long Term Goals: To be accomplished in 4 weeks: 
            1) I HEP for self maintenance of improved functional capacity.             2) pt to report no difficulty getting in/out of car. Current: met 12/2/19 
            3) pt to report being able to go to grocery store. Current: met 12/2/19 
            4) improve FOTO from 56 to 64. Current: Not met- slight regression to 53% 12/4/19 PLAN 
[]  Upgrade activities as tolerated     []  Continue plan of care 
[]  Update interventions per flow sheet [x]  Discharge due to: progression towards LTG's 
[]  Other:_ Nestor Finn DPT, CMTPT 12/4/2019  10:11 AM 
 
Future Appointments Date Time Provider Dilma Duque 12/17/2019 11:30 AM David Garcia  [address]  
12/26/2019  9:15 AM [facility]  
3/19/2020 10:45 AM Maria Fernanda Mann MD [facility] SCHED  
4/1/2020  9:00 AM [facility] SCHED  
8/5/2020  8:30 AM [facility] [address] [facility]  
 11/13/2020 11:15 AM Tonya Martinez MD [id number] [facility]

## 2019-12-05 NOTE — PROGRESS NOTES
In [address] [employer] [place], [address] 
[phone] fax Physical Therapy Discharge Summary Patient name: Maurice Cornejo Start of Care: 2019 Referral source: Rah Akbar NP : [id number] Medical Diagnosis: Cervicalgia [M54.2] Low back pain [M54.5] Pain in thoracic spine [M54.6] Payor: [place] MED[place] / Plan: [facility] [place] / Product Type: Medicare /  Onset Date:29 Treatment Diagnosis: cervicalgia, dorsalgia, muscle spasm Prior Hospitalization: see medical history Provider#: [id number] Medications: Verified on Patient summary List  
 Comorbidities: bladder stone, varicose veins B LE, HBP, pacemaker Prior Level of Function: past hx of thoracic pain with prior episode of PT care for it Visits from Start of Care: 6    Missed Visits: 0 Reporting Period : 2019 to 2019 Summary of Care: 
Short Term Goals: To be accomplished in 2 weeks: 
            6) initiate HEP and progress as able Current: Initaited per patient report. 19 
            2) no pain in lumbar with cervical return to neutral from FB, cervical rot = B with no lumbar pain to improve upper trunk mobility for functional activities. Current: mild right sided cervical pain with rotation otherwise unremarkable 19 
            3) improve trunk strength to eliminate pain when test core/tricep strength of UE to improve ability to ambulate community distances and get in/out of car. Current: no pain, decreased trunk stability noted with pushing.  19 
            4) increase trunk rotation to = B to improve ability to get in/out of car. Current: ROM not assessed but pt reports she met functional goal 19 Long Term Goals: To be accomplished in 4 weeks: 
            1) I HEP for self maintenance of improved functional capacity.             2) pt to report no difficulty getting in/out of car. Current: met 12/2/19 
            3) pt to report being able to go to grocery store. Current: met 12/2/19 
            4) improve FOTO from 56 to 64. Current: Not met- slight regression to 53% 12/4/19 ASSESSMENT/RECOMMENDATIONS: Pt reports that she feels comfortable with D/C to HEP today. She has met most LTG's, slight decline in FOTO score not consistent with care to this time. Updated HEP with explanation to patient and daughter [x]Discontinue therapy: [x]Patient has reached or is progressing toward set goals []Patient is non-compliant or has abdicated 
    []Due to lack of appreciable progress towards set goals Sandor VAZQUEZT, CMTPT 12/5/2019 3:07 PM

## 2019-12-17 NOTE — TELEPHONE ENCOUNTER
Patient is scheduled to have colonoscopy with Dr Braden Marcelino on Thursday and patient is on Eliquis how long can stop before procedure. Please Advise.

## 2019-12-19 NOTE — TELEPHONE ENCOUNTER
Spoke with adilson and she is aware that patient can hold eliquis for 2 days and then resume post op.

## 2020-01-01 ENCOUNTER — TELEPHONE (OUTPATIENT)
Dept: CARDIOLOGY CLINIC | Age: 85
End: 2020-01-01

## 2020-01-01 ENCOUNTER — OFFICE VISIT (OUTPATIENT)
Dept: CARDIOLOGY CLINIC | Age: 85
End: 2020-01-01

## 2020-01-01 VITALS
WEIGHT: 171 LBS | HEIGHT: 60 IN | BODY MASS INDEX: 33.57 KG/M2 | DIASTOLIC BLOOD PRESSURE: 54 MMHG | HEART RATE: 113 BPM | SYSTOLIC BLOOD PRESSURE: 112 MMHG

## 2020-01-01 DIAGNOSIS — I48.0 PAROXYSMAL ATRIAL FIBRILLATION (HCC): ICD-10-CM

## 2020-01-01 DIAGNOSIS — Z92.29 HISTORY OF AMIODARONE THERAPY: ICD-10-CM

## 2020-01-01 DIAGNOSIS — I50.33 ACUTE ON CHRONIC DIASTOLIC CONGESTIVE HEART FAILURE (HCC): Primary | ICD-10-CM

## 2020-01-01 DIAGNOSIS — Z95.0 CARDIAC PACEMAKER IN SITU: ICD-10-CM

## 2020-01-01 DIAGNOSIS — I50.33 ACUTE ON CHRONIC DIASTOLIC CONGESTIVE HEART FAILURE (HCC): ICD-10-CM

## 2020-01-01 DIAGNOSIS — E66.01 MORBID OBESITY (HCC): ICD-10-CM

## 2020-01-01 RX ORDER — VALSARTAN AND HYDROCHLOROTHIAZIDE 160; 25 MG/1; MG/1
TABLET ORAL
Qty: 90 TAB | Refills: 0 | Status: SHIPPED | OUTPATIENT
Start: 2020-01-01 | End: 2020-01-01

## 2020-01-01 RX ORDER — VALSARTAN AND HYDROCHLOROTHIAZIDE 160; 25 MG/1; MG/1
TABLET ORAL
Qty: 90 TAB | Refills: 0 | OUTPATIENT
Start: 2020-01-01

## 2020-01-01 RX ORDER — AMOXICILLIN AND CLAVULANATE POTASSIUM 875; 125 MG/1; MG/1
TABLET, FILM COATED ORAL EVERY 12 HOURS
COMMUNITY

## 2020-01-01 RX ORDER — HYDROCHLOROTHIAZIDE 12.5 MG/1
12.5 TABLET ORAL
Qty: 30 TAB | Refills: 1 | Status: SHIPPED | OUTPATIENT
Start: 2020-01-01 | End: 2020-01-01

## 2020-01-01 RX ORDER — AMIODARONE HYDROCHLORIDE 200 MG/1
TABLET ORAL
Qty: 90 TAB | Refills: 0 | Status: SHIPPED | OUTPATIENT
Start: 2020-01-01

## 2020-01-01 RX ORDER — VALSARTAN 160 MG/1
TABLET ORAL DAILY
COMMUNITY
End: 2020-01-01 | Stop reason: ALTCHOICE

## 2020-01-01 RX ORDER — HYDROCHLOROTHIAZIDE 12.5 MG/1
TABLET ORAL
Qty: 90 TAB | Refills: 1 | Status: SHIPPED | OUTPATIENT
Start: 2020-01-01

## 2020-03-19 NOTE — PROGRESS NOTES
HISTORY OF PRESENT ILLNESS Lalo Betancur is a 81 y.o. female. f/u of CHF, HTN, DDD, obesity 11/18 has left lower back and left infra axillary pain for last 2-3 weeks and has been advised gallbladder surgery. 6/17 has cough x 3wks, improving slowly 12/15 diarrhoea and diverticulitis for 1-2 wks; also happened in 11/15 Palpitations The history is provided by the medical records (h/o Parox AFib on pacer check). This is a recurrent problem. The current episode started more than 1 week ago. The problem occurs daily. The problem is associated with exercise (walking). Associated symptoms include lower extremity edema and shortness of breath. Pertinent negatives include no fever, no malaise/fatigue, no chest pain, no claudication, no orthopnea, no PND, no nausea, no vomiting, no headaches, no dizziness and no cough. Her past medical history is significant for hypertension. CHF The history is provided by the medical records. This is a chronic problem. The problem has not changed since onset. Associated symptoms include shortness of breath. Pertinent negatives include no chest pain and no headaches. Hypertension The history is provided by the medical records. This is a chronic problem. Associated symptoms include shortness of breath. Pertinent negatives include no chest pain and no headaches. Cholesterol Problem The history is provided by the medical records. This is a chronic problem. Associated symptoms include shortness of breath. Pertinent negatives include no chest pain and no headaches. Shortness of Breath The history is provided by the patient. This is a recurrent problem. The problem occurs frequently. The current episode started more than 1 week ago. The problem has been rapidly improving. Associated symptoms include leg swelling.  Pertinent negatives include no fever, no headaches, no cough, no wheezing, no PND, no orthopnea, no chest pain, no vomiting, no rash and no claudication. The problem's precipitants include exercise (walking 500-600 steps; 8/16 1000 steps;1/17 700 steps; 12/17 3-4 mts; 6/18 1000 steps; 3/20 2 times [place] office building). Leg Swelling The history is provided by the patient. This is a new problem. The current episode started more than 1 week ago. The problem has been resolved (Since prednisone discontinued). Associated symptoms include shortness of breath. Pertinent negatives include no chest pain and no headaches. Review of Systems Constitutional: Negative for chills, fever, malaise/fatigue and weight loss. HENT: Negative for nosebleeds. Eyes: Negative for discharge. Respiratory: Positive for shortness of breath. Negative for cough and wheezing. Cardiovascular: Positive for palpitations (PAF) and leg swelling. Negative for chest pain, orthopnea, claudication and PND. Gastrointestinal: Negative for diarrhea, nausea and vomiting. Genitourinary: Negative for dysuria and hematuria. Musculoskeletal: Negative for joint pain. Skin: Negative for rash. Neurological: Negative for dizziness, seizures, loss of consciousness and headaches. Endo/Heme/Allergies: Negative for polydipsia. Does not bruise/bleed easily. Psychiatric/Behavioral: Negative for depression and substance abuse. The patient does not have insomnia. Allergies Allergen Reactions  Penicillins Unknown (comments) Pt states she's not allergic Past Medical History:  
Diagnosis Date  Atrial fibrillation ([address]  Balance problem  Chest pain, unspecified Resolved.  Chronic diastolic heart failure ([address] Stable symptoms.  Congestive heart failure, unspecified  Diarrhea 12/15/2015  
 f/u PCP  Essential hypertension  High blood pressure  Low back pain  Morbid obesity ([address]  On amiodarone therapy 6/26/2015 12/14 SGOT45, SGPT41, TSH 2.7 3/10 84%DLCO   
 Other and unspecified hyperlipidemia  Pacemaker  Reflux  Trauma  Unspecified disorder of thyroid Min hyperthyroidism. Family History Problem Relation Age of Onset  Heart Attack Father 39  Sudden Death Father  Hypertension Mother  Heart Disease Neg Hx No history of ischemic heart disease.  Stroke Neg Hx Social History Tobacco Use  Smoking status: Never Smoker  Smokeless tobacco: Never Used Substance Use Topics  Alcohol use: No  
  Alcohol/week: 0.0 standard drinks  Drug use: No  
  
 
Current Outpatient Medications Medication Sig  
 amoxicillin-clavulanate (AUGMENTIN) 875-125 mg per tablet Take  by mouth every twelve (12) hours.  valsartan (DIOVAN) 160 mg tablet Take  by mouth daily.  amiodarone (CORDARONE) 200 mg tablet TAKE 1 TABLET BY MOUTH DAILY (Patient taking differently: 100 mg.)  dicyclomine (BENTYL) 10 mg capsule Take 10 mg by mouth 4 times daily (before meals and nightly).  methocarbamol (ROBAXIN) 500 mg tablet Take  by mouth four (4) times daily.  methylPREDNISolone (MEDROL, BERNY,) 4 mg tablet Per dose pack instructions  lidocaine (LIDODERM) 5 % 1 Patch by TransDERmal route every twenty-four (24) hours. Apply patch to the affected area for 12 hours and remove for 12 hours .  potassium chloride (KLOR-CON) 10 mEq tablet TAKE 1 TABLET BY MOUTH DAILY  ELIQUIS 5 mg tablet TAKE 1 TABLET BY MOUTH TWICE DAILY  ezetimibe (ZETIA) 10 mg tablet TAKE 1 TABLET BY MOUTH DAILY  pravastatin (PRAVACHOL) 40 mg tablet TAKE 1 TABLET BY MOUTH EVERY NIGHT  ZETIA 10 mg tablet TAKE 1 TABLET BY MOUTH DAILY  mometasone (NASONEX) 50 mcg/actuation nasal spray 2 Sprays daily.  benzonatate (TESSALON PERLES) 100 mg capsule Take 100 mg by mouth three (3) times daily as needed for Cough.  cyclobenzaprine (FLEXERIL) 5 mg tablet Take 5 mg by mouth as needed.  CALCIUM CARBONATE-VITAMIN D2 PO Take  by mouth.  polyethylene glycol (MIRALAX) 17 gram packet Take 17 g by mouth as needed.  ranitidine (ZANTAC) 150 mg tablet Take 150 mg by mouth two (2) times a day. No current facility-administered medications for this visit. Past Surgical History:  
Procedure Laterality Date  HX PACEMAKER  2002 [employer] cardiac pacemaker-Had RV lead perforating and had sternotomy at [facility]. Diagnostic Studies: 
CARDIOLOGY STUDIES 3/10/2015 9/17/2013 PFT's with DLCO Result 84%DLCO 94%DLCO Some recent data might be hidden Visit Vitals /54 Pulse (!) 113 Ht 5' (1.524 m) Wt 77.6 kg (171 lb) BMI 33.40 kg/m² Ms. Ephraim Harris has a reminder for a \"due or due soon\" health maintenance. I have asked that she contact her primary care provider for follow-up on this health maintenance. Physical Exam  
Constitutional: She is oriented to person, place, and time. She appears well-developed and well-nourished. No distress. HENT:  
Head: Normocephalic and atraumatic. Mouth/Throat: Normal dentition. Eyes: Right eye exhibits no discharge. Left eye exhibits no discharge. No scleral icterus. Neck: Neck supple. No JVD present. Carotid bruit is not present. No thyromegaly present. Cardiovascular: Normal rate, regular rhythm, S1 normal, S2 normal, normal heart sounds and intact distal pulses. Exam reveals no gallop and no friction rub. No murmur heard. Pulmonary/Chest: Effort normal and breath sounds normal. She has no wheezes. She has no rales. Abdominal: Soft. She exhibits no mass. There is no abdominal tenderness. Musculoskeletal:     
   General: Edema (2+) present. Lymphadenopathy:  
     Right cervical: No superficial cervical adenopathy present. Left cervical: No superficial cervical adenopathy present. Neurological: She is alert and oriented to person, place, and time. Skin: Skin is warm and dry. No rash noted. Psychiatric: She has a normal mood and affect. Her behavior is normal.  
 
 
ASSESSMENT and PLAN History of Crohn's disease HLD: LIPID COMPLETE PANELResulted: 7/23/2019 3:30 PM 
[facility] Component Name Value Ref Range Cholesterol 134 110 - 200 mg/dL Triglyceride 76 40 - 149 mg/dL HDL 54 40 - 59 mg/dL Cholesterol/HDL 2.5 0.0 - 5.0 LDL CALCULATION 65 50 - 99 mg/dL VLDL CALCULATION 15 8 - 30 mg/dL History of amiodarone therapy: 12/17 78%DLCO 
  LFT : Normal 2/18; 11/18 TSH: Normal 9/18; 7/19 Pacemaker DDD:  
Office Checks:  6/18(frequent AHR); 9/19 normal function Remote Checks : 2/20 normal function, PAF; 
 
 
Routine TSH and LFTs due to amiodarone treatment. Blood pressure is not requiring so many medications. As she has significant edema, discontinue Diovan and try HCTZ. Follow-up electrolytes. Pacemaker function is normal.  She continues with paroxysmal A. fib. Continue anticoagulation. Diagnoses and all orders for this visit: 
 
1. Acute on chronic diastolic congestive heart failure ([address] -     hydroCHLOROthiazide (HYDRODIURIL) 12.5 mg tablet; Take 1 Tab by mouth daily as needed (Ed[place]). -     METABOLIC PANEL, BASIC; Future -     MAGNESIUM; Future 2. Paroxysmal atrial fibrillation (HCC) 
-     HEPATIC FUNCTION PANEL; Future 
-     TSH 3RD GENERATION; Future 3. Morbid obesity ([address] 4. Cardiac pacemaker in situ-DDD 5. History of amiodarone therapy 
-     HEPATIC FUNCTION PANEL; Future 
-     TSH 3RD GENERATION; Future Pertinent laboratory and test data reviewed and discussed with patient. See patient instructions also for other medical advice given Medications Discontinued During This Encounter Medication Reason  amLODIPine (NORVASC) 2.5 mg tablet  valsartan-hydroCHLOROthiazide (DIOVAN-HCT) 160-25 mg per tablet  acetaminophen-codeine (TYLENOL-CODEINE #3) 300-30 mg per tablet  methylPREDNISolone (MEDROL, BERNY,) 4 mg tablet Therapy Completed  valsartan (DIOVAN) 160 mg tablet Alternate Therapy Follow-up and Dispositions · Return in about 6 weeks (around 4/30/2020), or if symptoms worsen or fail to improve, for post test.

## 2020-03-19 NOTE — PROGRESS NOTES
1. Have you been to the ER, urgent care clinic since your last visit? Hospitalized since your last visit? Yes where: [facility]/intestinal issues 2. Have you seen or consulted any other health care providers outside of the [address] [place] [address] since your last visit? Include any pap smears or colon screening. Yes Where: GI  
 
3. Since your last visit, have you had any of the following symptoms? shortness of breath and swelling in legs/arms. 4.  Have you had any blood work, X-rays or cardiac testing? Yes Where: [facility] Reason for visit: Labs 5. Where do you normally have your labs drawn? [facility] 6. Do you need any refills today?   No

## 2020-04-06 NOTE — TELEPHONE ENCOUNTER
Mrs Lili Joseph is in [facility] and is aware that we don't service there anymore but her son wants to speak with you about her.

## 2020-04-08 NOTE — TELEPHONE ENCOUNTER
Ms Gomez's grandauzelalem Stack said they are going to be putting Ms Jaswinder Pinon on Hospice and she would like to speak with you.

## 2020-04-22 DIAGNOSIS — I48.0 PAROXYSMAL ATRIAL FIBRILLATION (HCC): ICD-10-CM

## 2020-04-22 RX ORDER — AMIODARONE HYDROCHLORIDE 200 MG/1
TABLET ORAL
Qty: 90 TAB | Refills: 0 | OUTPATIENT
Start: 2020-04-22